# Patient Record
Sex: MALE | Race: OTHER | HISPANIC OR LATINO | Employment: FULL TIME | URBAN - METROPOLITAN AREA
[De-identification: names, ages, dates, MRNs, and addresses within clinical notes are randomized per-mention and may not be internally consistent; named-entity substitution may affect disease eponyms.]

---

## 2017-06-15 ENCOUNTER — HOSPITAL ENCOUNTER (EMERGENCY)
Facility: HOSPITAL | Age: 22
Discharge: HOME | End: 2017-06-15
Attending: EMERGENCY MEDICINE | Admitting: EMERGENCY MEDICINE
Payer: COMMERCIAL

## 2017-06-15 VITALS
DIASTOLIC BLOOD PRESSURE: 61 MMHG | OXYGEN SATURATION: 100 % | RESPIRATION RATE: 16 BRPM | HEART RATE: 81 BPM | WEIGHT: 132.28 LBS | SYSTOLIC BLOOD PRESSURE: 120 MMHG | TEMPERATURE: 97.6 F

## 2017-06-15 DIAGNOSIS — F11.10 HEROIN ABUSE (HCC): Primary | ICD-10-CM

## 2017-06-15 LAB
AMPHETAMINES SERPL QL SCN: NEGATIVE
BARBITURATES UR QL: NEGATIVE
BENZODIAZ UR QL: POSITIVE
COCAINE UR QL: POSITIVE
ETHANOL EXG-MCNC: 0 MG/DL
METHADONE UR QL: NEGATIVE
OPIATES UR QL SCN: POSITIVE
PCP UR QL: NEGATIVE
THC UR QL: NEGATIVE

## 2017-06-15 PROCEDURE — 82075 ASSAY OF BREATH ETHANOL: CPT | Performed by: EMERGENCY MEDICINE

## 2017-06-15 PROCEDURE — 80307 DRUG TEST PRSMV CHEM ANLYZR: CPT | Performed by: EMERGENCY MEDICINE

## 2017-06-15 PROCEDURE — 99284 EMERGENCY DEPT VISIT MOD MDM: CPT

## 2017-06-15 RX ORDER — DICYCLOMINE HCL 20 MG
20 TABLET ORAL ONCE
Status: COMPLETED | OUTPATIENT
Start: 2017-06-15 | End: 2017-06-15

## 2017-06-15 RX ORDER — ONDANSETRON 4 MG/1
4 TABLET, ORALLY DISINTEGRATING ORAL ONCE
Status: COMPLETED | OUTPATIENT
Start: 2017-06-15 | End: 2017-06-15

## 2017-06-15 RX ADMIN — DICYCLOMINE HYDROCHLORIDE 20 MG: 20 TABLET ORAL at 11:06

## 2017-06-15 RX ADMIN — ONDANSETRON 4 MG: 4 TABLET, ORALLY DISINTEGRATING ORAL at 11:06

## 2018-05-03 ENCOUNTER — APPOINTMENT (EMERGENCY)
Dept: RADIOLOGY | Facility: HOSPITAL | Age: 23
End: 2018-05-03
Payer: COMMERCIAL

## 2018-05-03 ENCOUNTER — HOSPITAL ENCOUNTER (EMERGENCY)
Facility: HOSPITAL | Age: 23
Discharge: HOME | End: 2018-05-03
Attending: EMERGENCY MEDICINE
Payer: COMMERCIAL

## 2018-05-03 VITALS
OXYGEN SATURATION: 98 % | HEART RATE: 91 BPM | DIASTOLIC BLOOD PRESSURE: 79 MMHG | TEMPERATURE: 99.2 F | WEIGHT: 150 LBS | SYSTOLIC BLOOD PRESSURE: 133 MMHG | RESPIRATION RATE: 16 BRPM

## 2018-05-03 DIAGNOSIS — M25.531 RIGHT WRIST PAIN: Primary | ICD-10-CM

## 2018-05-03 PROCEDURE — 99283 EMERGENCY DEPT VISIT LOW MDM: CPT

## 2018-05-03 PROCEDURE — 73110 X-RAY EXAM OF WRIST: CPT

## 2018-05-03 RX ORDER — NAPROXEN 500 MG/1
500 TABLET ORAL 2 TIMES DAILY WITH MEALS
Qty: 30 TABLET | Refills: 0 | Status: SHIPPED | OUTPATIENT
Start: 2018-05-03 | End: 2022-04-23 | Stop reason: ALTCHOICE

## 2018-05-03 NOTE — DISCHARGE INSTRUCTIONS
Wrist Injury   WHAT YOU NEED TO KNOW:   A wrist injury happens when the tissues of your wrist joint are damaged  Your wrist joint is made up of tendons, ligaments, nerves, and bones  Two common types of injuries that can happen to your wrist are sprains and strains  A sprain can happen when the ligaments are stretched or torn  Ligaments are bands of elastic tissue that connect and hold the bones together  A strain happens when a tendon or muscle is overused, stretched, or torn  Tendons attach your hand and arm muscles to the bones of the wrist    DISCHARGE INSTRUCTIONS:   Medicines:   · NSAIDs:  These medicines decrease swelling, pain, and fever  NSAIDs are available without a doctor's order  Ask which medicine is right for you  Ask how much to take and when to take it  Take as directed  NSAIDs can cause stomach bleeding and kidney problems if not taken correctly  · Pain medicine: You may be given a prescription medicine to decrease pain  Do not wait until the pain is severe before you take this medicine  · Take your medicine as directed  Contact your healthcare provider if you think your medicine is not helping or if you have side effects  Tell him of her if you are allergic to any medicine  Keep a list of the medicines, vitamins, and herbs you take  Include the amounts, and when and why you take them  Bring the list or the pill bottles to follow-up visits  Carry your medicine list with you in case of an emergency  Follow up with your healthcare provider as directed:  Write down your questions so you remember to ask them during your visits  Manage your symptoms:   · Wrist supports:  A cast or splint may be put on your fingers, hand, and wrist to support your wrist and prevent further damage  Wear these as directed  Ask for instructions on how to bathe while you are wearing a splint or case  · Rest:  You may need to rest your wrist for at least 48 hours and avoid activities that cause pain   Ask what activities you should avoid and for how long  · Ice:  Ice helps decrease swelling and pain  Ice may also help prevent tissue damage  Use an ice pack or put crushed ice in a plastic bag  Cover it with a towel and place it on your injured wrist for 15 to 20 minutes every hour as directed  · Compression:  Your healthcare provider may suggest you wrap your wrist with an elastic bandage  This will help decrease swelling, support your wrist, and help it heal  Wear your wrist wrap as directed  Ask for instructions on how to wrap your wrist     · Elevation:  When you sit or lie down, keep your wrist at or above the level of your heart  This may help decrease pain and swelling  Physical therapy:  Your healthcare provider may recommend that you go to physical therapy  A physical therapist shows you how to do exercises that can help to strengthen your wrist and improve its range of movement  These exercises may also help decrease your pain  Prevent another wrist injury:   · Do strengthening exercises: Your healthcare provider or physical therapist may suggest that you do exercises to strengthen your hand and arm muscles  Ask when you may return to your regular physical activities or sports  If you start to exercise too soon it may cause you to injure your wrist again  · Protect your wrists:  Wrist guard splints or protective tape can help to support your wrist during exercise and sports  These devices may also keep your wrist from bending too far back  Ask for more information about the type of wrist support that you should use  Contact your healthcare provider if:   · You have a fever  · The bruising, swelling, or pain in your wrist gets worse  · You have questions or concerns about your condition or care  Return to the emergency department if:   · The skin on or near your wrist or hand feels cold, or it turns blue or white      · The skin on or near your wrist or hand is very tight, raised, and swollen  · You have new trouble moving and using your hands, fingers, or wrist     · Your wrist, hands, or fingers become swollen, red, numb, or they tingle  · Your wrist has any open wounds, including from surgery, that are red, swollen, warm, or have pus coming from them  © 2017 [employer] [facility] Information is for End User's use only and may not be sold, redistributed or otherwise used for commercial purposes  All illustrations and images included in CareNotes® are the copyrighted property of [employer]  or Markie Kirkland  The above information is an  only  It is not intended as medical advice for individual conditions or treatments  Talk to your doctor, nurse or pharmacist before following any medical regimen to see if it is safe and effective for you  DISCHARGE INSTRUCTIONS:    FOLLOW UP WITH YOUR PRIMARY CARE PROVIDER OR THE [address] [place] [address]  MAKE AN APPOINTMENT TO BE SEEN  TAKE NAPROXEN AS PRESCRIBED FOR MILD PAIN AND INFLAMMATION  IF RASH, SHORTNESS OF BREATH OR TROUBLE SWALLOWING, STOP TAKING THE MEDICATION AND BE SEEN  FOLLOW UP WITH THE RECOMMENDED ORTHOPEDIC SPECIALIST  MAKE AN APPOINTMENT TO BE SEEN  REST, ICE AND ELEVATE THE AREA  WEAR THE SPLINT UNTIL SEEN BY THE RECOMMENDED ORTHOPEDIC SPECIALIST  DO NOT GET THE SPLINT WET  DO NOT TAKE THE SPLINT OFF  IF SYMPTOMS WORSEN OR NEW SYMPTOMS ARISE, RETURN TO THE ER TO BE SEEN

## 2018-05-03 NOTE — ED PROVIDER NOTES
History  Chief Complaint   Patient presents with    Wrist Pain     patient reports he injured his R wrist about four weeks ago  now complains of pain       23y  o male with PMH of anxiety and depression presents to the ER for right wrist pain for 4 weeks  Patient states he leaned too far back onto his hand and since then has been having this pain  He denies taking any medication for pain  He describes his pain as a "nerve pinching"  He denies radiation of pain  He rates his pain 4/10 and states it comes and goes  He denies fever, chills, chest pain, dyspnea, N/V/D, abdominal pain, weakness or paresthesias  History provided by:  Patient   used: No        None       Past Medical History:   Diagnosis Date    Anxiety     Depression        History reviewed  No pertinent surgical history  History reviewed  No pertinent family history  I have reviewed and agree with the history as documented  Social History   Substance Use Topics    Smoking status: Current Every Day Smoker     Packs/day: 1 00    Smokeless tobacco: Not on file    Alcohol use No        Review of Systems   Constitutional: Negative for chills and fever  Eyes: Negative for redness  Respiratory: Negative for shortness of breath  Cardiovascular: Negative for chest pain  Gastrointestinal: Negative for abdominal pain, diarrhea, nausea and vomiting  Musculoskeletal: Negative for neck stiffness  Skin: Negative for rash  Allergic/Immunologic: Negative for food allergies  Neurological: Negative for weakness and numbness         Physical Exam  ED Triage Vitals   Temperature Pulse Respirations Blood Pressure SpO2   05/03/18 1813 05/03/18 1811 05/03/18 1811 05/03/18 1811 05/03/18 1811   99 2 °F (37 3 °C) 91 16 133/79 98 %      Temp Source Heart Rate Source Patient Position - Orthostatic VS BP Location FiO2 (%)   05/03/18 1813 05/03/18 1811 05/03/18 1811 -- --   Temporal Monitor Sitting        Pain Score 05/03/18 1811       No Pain           Orthostatic Vital Signs  Vitals:    05/03/18 1811   BP: 133/79   Pulse: 91   Patient Position - Orthostatic VS: Sitting       Physical Exam   Constitutional:  Non-toxic appearance  No distress  HENT:   Head: Normocephalic and atraumatic  Right Ear: Tympanic membrane, external ear and ear canal normal  No drainage, swelling or tenderness  No foreign bodies  Tympanic membrane is not erythematous  No hemotympanum  Left Ear: Tympanic membrane, external ear and ear canal normal  No drainage, swelling or tenderness  No foreign bodies  Tympanic membrane is not erythematous  No hemotympanum  Nose: Nose normal    Mouth/Throat: Uvula is midline, oropharynx is clear and moist and mucous membranes are normal  No uvula swelling  No posterior oropharyngeal edema, posterior oropharyngeal erythema or tonsillar abscesses  No tonsillar exudate  Neck: Normal range of motion and phonation normal  Neck supple  No tracheal deviation present  Cardiovascular: Normal rate, regular rhythm, S1 normal, S2 normal and normal heart sounds  Exam reveals no gallop and no friction rub  No murmur heard  Pulmonary/Chest: Effort normal and breath sounds normal  No respiratory distress  He has no decreased breath sounds  He has no wheezes  He has no rhonchi  He has no rales  He exhibits no tenderness  Musculoskeletal:        Right wrist: He exhibits tenderness and bony tenderness  He exhibits normal range of motion, no swelling, no effusion, no crepitus, no deformity and no laceration  Right forearm: Normal         Right hand: He exhibits tenderness and bony tenderness  He exhibits normal range of motion, normal capillary refill, no deformity, no laceration and no swelling  Normal sensation noted  Normal strength noted  Hands:  Neurological: He is alert  GCS eye subscore is 4  GCS verbal subscore is 5  GCS motor subscore is 6  Skin: Skin is warm and dry  No rash noted  Psychiatric: He has a normal mood and affect  Nursing note and vitals reviewed  ED Medications  Medications - No data to display    Diagnostic Studies  Results Reviewed     None                 XR wrist 3+ views RIGHT   ED Interpretation by Fracisco Blanco PA-C (05/03 1932)   No acute fracture seen by me at this time  Widening of joint space between scaphoid and trapezium  Final Result by Rodney Marrufo MD (05/03 1952)      No acute osseous abnormality  Workstation performed: [id number]                    Procedures  Orthopedic Injury  Date/Time: 5/3/2018 7:35 PM  Performed by: Aisha Bruno by: Steffany Mcnamara   Consent: Verbal consent obtained    Consent given by: patient  Patient understanding: patient states understanding of the procedure being performed  Imaging studies: imaging studies available  Patient identity confirmed: arm band  Injury location: wrist  Location details: right wrist  Injury type: soft tissue  Pre-procedure neurovascular assessment: neurovascularly intact  Pre-procedure distal perfusion: normal  Pre-procedure neurological function: normal  Pre-procedure range of motion: normal    Anesthesia:  Local anesthesia used: no  General Anesthesia used: noSkeletal traction used: no  Immobilization: splint  Splint type: volar short arm  Supplies used: cotton padding,  elastic bandage and Ortho-Glass  Post-procedure neurovascular assessment: post-procedure neurovascularly intact  Post-procedure distal perfusion: normal  Post-procedure neurological function: normal  Post-procedure range of motion: normal  Patient tolerance: Patient tolerated the procedure well with no immediate complications             Phone Contacts  ED Phone Contact    ED Course                               MDM  Number of Diagnoses or Management Options  Right wrist pain: new and requires workup  Diagnosis management comments: DDX consists of but not limited to: fracture, contusion, dislocation, sprain    Will xray the wrist to r/o fracture  Widening between scaphoid and trapezium  Will place in volar splint  At discharge, I instructed the patient to:  -follow up with pcp  -take Naproxen as prescribed for pain and inflammation  -follow up with the recommended orthopedic specialist  -rest, ice and elevate the hand  -wear the splint until seen by orthopedics  -return to the ER if symptoms worsened or new symptoms arose  Patient agreed to this plan and was stable at time of discharge  Amount and/or Complexity of Data Reviewed  Tests in the radiology section of CPT®: ordered and reviewed  Independent visualization of images, tracings, or specimens: yes    Patient Progress  Patient progress: stable    CritCare Time    Disposition  Final diagnoses:   Right wrist pain     Time reflects when diagnosis was documented in both MDM as applicable and the Disposition within this note     Time User Action Codes Description Comment    5/3/2018  7:33 PM Mai Christie Add [M25 [id number]] Right wrist pain       ED Disposition     ED Disposition Condition Comment    Discharge  Kenya Almeida discharge to home/self care  Condition at discharge: Stable        Follow-up Information     Follow up With Specialties Details Why MD [facility] Schedule an appointment as soon as possible for a visit in 1 day  [address]      [address] Orthopedic Surgery Schedule an appointment as soon as possible for a visit in 1 day  [facility] [id number] [phone]        Discharge Medication List as of 5/3/2018  7:34 PM      START taking these medications    Details   naproxen (NAPROSYN) 500 mg tablet Take 1 tablet (500 mg total) by mouth 2 (two) times a day with meals, Starting Thu 5/3/2018, Print           No discharge procedures on file      ED Provider  Electronically Signed by Cl Willis PA-C  05/03/18 6752

## 2020-12-25 ENCOUNTER — HOSPITAL ENCOUNTER (EMERGENCY)
Facility: HOSPITAL | Age: 25
Discharge: HOME | End: 2020-12-25
Attending: EMERGENCY MEDICINE
Payer: COMMERCIAL

## 2020-12-25 VITALS
SYSTOLIC BLOOD PRESSURE: 141 MMHG | OXYGEN SATURATION: 100 % | RESPIRATION RATE: 20 BRPM | DIASTOLIC BLOOD PRESSURE: 68 MMHG | HEART RATE: 94 BPM | TEMPERATURE: 98.4 F

## 2020-12-25 DIAGNOSIS — T78.40XA ACUTE ALLERGIC REACTION: Primary | ICD-10-CM

## 2020-12-25 PROCEDURE — 96375 TX/PRO/DX INJ NEW DRUG ADDON: CPT

## 2020-12-25 PROCEDURE — 99282 EMERGENCY DEPT VISIT SF MDM: CPT | Performed by: EMERGENCY MEDICINE

## 2020-12-25 PROCEDURE — 99283 EMERGENCY DEPT VISIT LOW MDM: CPT

## 2020-12-25 PROCEDURE — 96374 THER/PROPH/DIAG INJ IV PUSH: CPT

## 2020-12-25 RX ORDER — DIPHENHYDRAMINE HYDROCHLORIDE 50 MG/ML
25 INJECTION INTRAMUSCULAR; INTRAVENOUS ONCE
Status: COMPLETED | OUTPATIENT
Start: 2020-12-25 | End: 2020-12-25

## 2020-12-25 RX ADMIN — FAMOTIDINE 20 MG: 10 INJECTION INTRAVENOUS at 11:31

## 2020-12-25 RX ADMIN — DIPHENHYDRAMINE HYDROCHLORIDE 25 MG: 50 INJECTION, SOLUTION INTRAMUSCULAR; INTRAVENOUS at 11:30

## 2020-12-29 ENCOUNTER — OFFICE VISIT (OUTPATIENT)
Dept: URGENT CARE | Age: 25
End: 2020-12-29
Payer: COMMERCIAL

## 2020-12-29 VITALS
WEIGHT: 130 LBS | BODY MASS INDEX: 18.61 KG/M2 | RESPIRATION RATE: 18 BRPM | TEMPERATURE: 96.8 F | OXYGEN SATURATION: 97 % | HEART RATE: 96 BPM | HEIGHT: 70 IN

## 2020-12-29 DIAGNOSIS — R52 BODY ACHES: Primary | ICD-10-CM

## 2020-12-29 DIAGNOSIS — Z11.59 SCREENING FOR VIRAL DISEASE: ICD-10-CM

## 2020-12-29 PROCEDURE — U0003 INFECTIOUS AGENT DETECTION BY NUCLEIC ACID (DNA OR RNA); SEVERE ACUTE RESPIRATORY SYNDROME CORONAVIRUS 2 (SARS-COV-2) (CORONAVIRUS DISEASE [COVID-19]), AMPLIFIED PROBE TECHNIQUE, MAKING USE OF HIGH THROUGHPUT TECHNOLOGIES AS DESCRIBED BY CMS-2020-01-R: HCPCS | Performed by: NURSE PRACTITIONER

## 2020-12-29 PROCEDURE — G0382 LEV 3 HOSP TYPE B ED VISIT: HCPCS | Performed by: NURSE PRACTITIONER

## 2020-12-31 ENCOUNTER — TELEPHONE (OUTPATIENT)
Dept: URGENT CARE | Age: 25
End: 2020-12-31

## 2020-12-31 LAB — SARS-COV-2 RNA SPEC QL NAA+PROBE: NOT DETECTED

## 2022-04-23 ENCOUNTER — HOSPITAL ENCOUNTER (EMERGENCY)
Facility: HOSPITAL | Age: 27
Discharge: HOME | End: 2022-04-23
Attending: EMERGENCY MEDICINE
Payer: COMMERCIAL

## 2022-04-23 ENCOUNTER — APPOINTMENT (EMERGENCY)
Dept: ULTRASOUND IMAGING | Facility: HOSPITAL | Age: 27
End: 2022-04-23
Payer: COMMERCIAL

## 2022-04-23 VITALS
TEMPERATURE: 98.3 F | SYSTOLIC BLOOD PRESSURE: 140 MMHG | RESPIRATION RATE: 20 BRPM | BODY MASS INDEX: 18.69 KG/M2 | OXYGEN SATURATION: 97 % | HEART RATE: 78 BPM | WEIGHT: 130.29 LBS | DIASTOLIC BLOOD PRESSURE: 73 MMHG

## 2022-04-23 DIAGNOSIS — N50.811 PAIN IN RIGHT TESTICLE: Primary | ICD-10-CM

## 2022-04-23 LAB
BACTERIA UR QL AUTO: ABNORMAL /HPF
BILIRUB UR QL STRIP: NEGATIVE
CLARITY UR: CLEAR
COLOR UR: YELLOW
GLUCOSE UR STRIP-MCNC: NEGATIVE MG/DL
HGB UR QL STRIP.AUTO: ABNORMAL
KETONES UR STRIP-MCNC: NEGATIVE MG/DL
LEUKOCYTE ESTERASE UR QL STRIP: NEGATIVE
NITRITE UR QL STRIP: NEGATIVE
NON-SQ EPI CELLS URNS QL MICRO: ABNORMAL /HPF
OTHER STN SPEC: ABNORMAL
PH UR STRIP.AUTO: 7 [PH] (ref 4.5–8)
PROT UR STRIP-MCNC: NEGATIVE MG/DL
RBC #/AREA URNS AUTO: ABNORMAL /HPF
SP GR UR STRIP.AUTO: 1.02 (ref 1–1.03)
UROBILINOGEN UR QL STRIP.AUTO: 0.2 E.U./DL
WBC #/AREA URNS AUTO: ABNORMAL /HPF

## 2022-04-23 PROCEDURE — 99282 EMERGENCY DEPT VISIT SF MDM: CPT | Performed by: EMERGENCY MEDICINE

## 2022-04-23 PROCEDURE — 87591 N.GONORRHOEAE DNA AMP PROB: CPT | Performed by: EMERGENCY MEDICINE

## 2022-04-23 PROCEDURE — 81001 URINALYSIS AUTO W/SCOPE: CPT

## 2022-04-23 PROCEDURE — 99284 EMERGENCY DEPT VISIT MOD MDM: CPT

## 2022-04-23 PROCEDURE — 87491 CHLMYD TRACH DNA AMP PROBE: CPT | Performed by: EMERGENCY MEDICINE

## 2022-04-23 PROCEDURE — 76870 US EXAM SCROTUM: CPT

## 2022-04-23 NOTE — ED PROVIDER NOTES
History  Chief Complaint   Patient presents with    Testicle Injury     pt reports right testicle swelling after getting hit at work about 1 month ago       History provided by:  Patient   used: No    Testicle Pain  Quality:  Right testicular pain  Severity:  Moderate  Duration:  3 months  Timing:  Intermittent  Progression:  Waxing and waning  Chronicity:  Recurrent  Context:  Right testicular pain, mild swelling, also got hit by a solid thick/heavy wire in the right testicle 3 months back (works as )  Relieved by:  Nothing  Worsened by:  Nothing  Ineffective treatments:  None  Associated symptoms: no abdominal pain, no chest pain, no cough, no diarrhea, no fever, no headaches, no loss of consciousness, no nausea, no rash, no shortness of breath, no sore throat and no vomiting    Risk factors:  Hep C, Anxiety, Depression      None       Past Medical History:   Diagnosis Date    Anxiety     Depression     Hepatitis C        History reviewed  No pertinent surgical history  History reviewed  No pertinent family history  I have reviewed and agree with the history as documented  E-Cigarette/Vaping     E-Cigarette/Vaping Substances     Social History     Tobacco Use    Smoking status: Current Every Day Smoker     Packs/day: 1 00    Smokeless tobacco: Never Used   Substance Use Topics    Alcohol use: No     Alcohol/week: 1 0 standard drink     Types: 1 Cans of beer per week    Drug use: No     Comment: former heroin user, Aristides        Review of Systems   Constitutional: Negative for chills and fever  HENT: Negative for facial swelling, sore throat and trouble swallowing  Eyes: Negative for pain and visual disturbance  Respiratory: Negative for cough and shortness of breath  Cardiovascular: Negative for chest pain and leg swelling  Gastrointestinal: Negative for abdominal pain, diarrhea, nausea and vomiting  Genitourinary: Positive for testicular pain   Negative for dysuria and flank pain  Musculoskeletal: Negative for back pain, neck pain and neck stiffness  Skin: Negative for pallor and rash  Allergic/Immunologic: Negative for environmental allergies and immunocompromised state  Neurological: Negative for dizziness, loss of consciousness and headaches  Hematological: Negative for adenopathy  Does not bruise/bleed easily  Psychiatric/Behavioral: Negative for agitation and behavioral problems  All other systems reviewed and are negative  Physical Exam  Physical Exam  Vitals and nursing note reviewed  Constitutional:       General: He is not in acute distress  Appearance: He is well-developed  HENT:      Head: Normocephalic and atraumatic  Eyes:      Extraocular Movements: Extraocular movements intact  Cardiovascular:      Rate and Rhythm: Normal rate and regular rhythm  Pulmonary:      Effort: Pulmonary effort is normal  No respiratory distress  Abdominal:      Palpations: Abdomen is soft  Tenderness: There is no abdominal tenderness  There is no guarding or rebound  Genitourinary:     Penis: Normal and circumcised  Testes:         Right: Tenderness (mild right side) present  Musculoskeletal:         General: Normal range of motion  Cervical back: Normal range of motion and neck supple  Skin:     General: Skin is warm and dry  Neurological:      General: No focal deficit present  Mental Status: He is alert and oriented to person, place, and time     Psychiatric:         Mood and Affect: Mood normal          Behavior: Behavior normal          Vital Signs  ED Triage Vitals [04/23/22 1450]   Temperature Pulse Respirations Blood Pressure SpO2   98 3 °F (36 8 °C) 78 20 140/73 97 %      Temp Source Heart Rate Source Patient Position - Orthostatic VS BP Location FiO2 (%)   Oral -- -- -- --      Pain Score       --           Vitals:    04/23/22 1450   BP: 140/73   Pulse: 78         Visual Acuity      ED Medications  Medications - No data to display    Diagnostic Studies  Results Reviewed     Procedure Component Value Units Date/Time    Urine Microscopic [[id number]]  (Abnormal) Collected: 04/23/22 1536    Lab Status: Final result Specimen: Urine, Clean Catch Updated: 04/23/22 1606     RBC, UA None Seen /hpf      WBC, UA 0-1 /hpf      Epithelial Cells None Seen /hpf      Bacteria, UA None Seen /hpf      OTHER OBSERVATIONS Sperm Present    Chlamydia/GC amplified DNA by PCR [[id number]] Collected: 04/23/22 1546    Lab Status: In process Specimen: Urine, Other Updated: 04/23/22 1548    Urine Macroscopic, POC [[id number]]  (Abnormal) Collected: 04/23/22 1536    Lab Status: Final result Specimen: Urine Updated: 04/23/22 1537     Color, UA Yellow     Clarity, UA Clear     pH, UA 7 0     Leukocytes, UA Negative     Nitrite, UA Negative     Protein, UA Negative mg/dl      Glucose, UA Negative mg/dl      Ketones, UA Negative mg/dl      Urobilinogen, UA 0 2 E U /dl      Bilirubin, UA Negative     Blood, UA Trace     Specific [place], UA 1 020    Narrative:      CLINITEK RESULT                 US scrotum and testicles   Final Result by Avery Yanes MD (04/23 1614)       Normal        Workstation performed: [id number]                    Procedures  Procedures         ED Course  ED Course as of 04/23/22 1640   Sat Apr 23, 2022   1621 US results reviewed:    IMPRESSION:     Normal                                 SBIRT 20yo+      Most Recent Value   SBIRT (25 yo +)    In order to provide better care to our patients, we are screening all of our patients for alcohol and drug use  Would it be okay to ask you these screening questions?  No Filed at: 04/23/2022 1459                    [facility]  Number of Diagnoses or Management Options  Pain in right testicle: new and requires workup  Diagnosis management comments: Patient is a 66-year-old male, history of depression, anxiety, hepatitis-C, previous IV drug use, complains of right testicular pain, patient states that he has been having off and on pain since about 3 months, when he got hit by a solid wire while working (works as an ), feels mild swelling above the right testicle as well, often known urinary frequency, however was tested recently for STDs and came back negative, no hematuria, no abdominal flank pain, no penile discharge  On exam, patient is conscious, alert, vital signs stable, no acute distress, abdomen soft nontender, no CVA tenderness,  exam shows bilaterally descended testicles, normal lie, mild swelling above right testicle, possibly varicocele  Impression:  Nonspecific right testicular pain, possibly varicocele, rule out torsion, will get ultrasound, check urine, send gonorrhea/chlamydia however patient has no current STD symptoms  Amount and/or Complexity of Data Reviewed  Clinical lab tests: ordered  Tests in the radiology section of CPT®: ordered and reviewed  Independent visualization of images, tracings, or specimens: yes        Disposition  Final diagnoses:   Pain in right testicle     Time reflects when diagnosis was documented in both MDM as applicable and the Disposition within this note     Time User Action Codes Description Comment    4/23/2022  3:30 PM Rajeev [address] Pain in right testicle       ED Disposition     ED Disposition Condition Date/Time Comment    Discharge Stable Sat Apr 23, 2022  4:22 PM Selwyn Mena discharge to home/self care  Follow-up Information     Follow up With Specialties Details Why Contact Info Additional [address] [facility] [address] [place] [facility]  As needed [address] [facility] [address]  [address]  [facility] [address] [facility], [place], [address] [phone]          There are no discharge medications for this patient  No discharge procedures on file      PDMP Review     None          ED Provider  Electronically Signed by           Norris Brand MD  04/23/22 1640

## 2022-04-26 LAB
C TRACH DNA SPEC QL NAA+PROBE: NEGATIVE
N GONORRHOEA DNA SPEC QL NAA+PROBE: NEGATIVE

## 2022-07-30 ENCOUNTER — APPOINTMENT (EMERGENCY)
Dept: RADIOLOGY | Facility: HOSPITAL | Age: 27
End: 2022-07-30
Payer: COMMERCIAL

## 2022-07-30 ENCOUNTER — HOSPITAL ENCOUNTER (EMERGENCY)
Facility: HOSPITAL | Age: 27
Discharge: HOME | End: 2022-07-30
Attending: EMERGENCY MEDICINE
Payer: COMMERCIAL

## 2022-07-30 VITALS
WEIGHT: 127.87 LBS | TEMPERATURE: 98.7 F | OXYGEN SATURATION: 98 % | SYSTOLIC BLOOD PRESSURE: 106 MMHG | BODY MASS INDEX: 18.31 KG/M2 | RESPIRATION RATE: 18 BRPM | DIASTOLIC BLOOD PRESSURE: 69 MMHG | HEART RATE: 60 BPM | HEIGHT: 70 IN

## 2022-07-30 DIAGNOSIS — R07.9 CHEST PAIN: ICD-10-CM

## 2022-07-30 DIAGNOSIS — F41.9 ANXIETY: ICD-10-CM

## 2022-07-30 DIAGNOSIS — R42 LIGHTHEADEDNESS: Primary | ICD-10-CM

## 2022-07-30 LAB
ANION GAP SERPL CALCULATED.3IONS-SCNC: 8 MMOL/L (ref 4–13)
BASOPHILS # BLD AUTO: 0.06 THOUSANDS/ΜL (ref 0–0.1)
BASOPHILS NFR BLD AUTO: 1 % (ref 0–1)
BUN SERPL-MCNC: 10 MG/DL (ref 5–25)
CALCIUM SERPL-MCNC: 8.8 MG/DL (ref 8.3–10.1)
CHLORIDE SERPL-SCNC: 103 MMOL/L (ref 96–108)
CO2 SERPL-SCNC: 29 MMOL/L (ref 21–32)
CREAT SERPL-MCNC: 1.29 MG/DL (ref 0.6–1.3)
EOSINOPHIL # BLD AUTO: 0.24 THOUSAND/ΜL (ref 0–0.61)
EOSINOPHIL NFR BLD AUTO: 3 % (ref 0–6)
ERYTHROCYTE [DISTWIDTH] IN BLOOD BY AUTOMATED COUNT: 11.6 % (ref 11.6–15.1)
GFR SERPL CREATININE-BSD FRML MDRD: 74 ML/MIN/1.73SQ M
GLUCOSE SERPL-MCNC: 102 MG/DL (ref 65–140)
HCT VFR BLD AUTO: 44.5 % (ref 36.5–49.3)
HGB BLD-MCNC: 15.2 G/DL (ref 12–17)
IMM GRANULOCYTES # BLD AUTO: 0.01 THOUSAND/UL (ref 0–0.2)
IMM GRANULOCYTES NFR BLD AUTO: 0 % (ref 0–2)
LYMPHOCYTES # BLD AUTO: 3.51 THOUSANDS/ΜL (ref 0.6–4.47)
LYMPHOCYTES NFR BLD AUTO: 43 % (ref 14–44)
MCH RBC QN AUTO: 31.5 PG (ref 26.8–34.3)
MCHC RBC AUTO-ENTMCNC: 34.2 G/DL (ref 31.4–37.4)
MCV RBC AUTO: 92 FL (ref 82–98)
MONOCYTES # BLD AUTO: 0.59 THOUSAND/ΜL (ref 0.17–1.22)
MONOCYTES NFR BLD AUTO: 7 % (ref 4–12)
NEUTROPHILS # BLD AUTO: 3.84 THOUSANDS/ΜL (ref 1.85–7.62)
NEUTS SEG NFR BLD AUTO: 46 % (ref 43–75)
NRBC BLD AUTO-RTO: 0 /100 WBCS
PLATELET # BLD AUTO: 217 THOUSANDS/UL (ref 149–390)
PMV BLD AUTO: 10.5 FL (ref 8.9–12.7)
POTASSIUM SERPL-SCNC: 3.6 MMOL/L (ref 3.5–5.3)
RBC # BLD AUTO: 4.82 MILLION/UL (ref 3.88–5.62)
SODIUM SERPL-SCNC: 140 MMOL/L (ref 135–147)
TSH SERPL DL<=0.05 MIU/L-ACNC: 2.63 UIU/ML (ref 0.45–4.5)
WBC # BLD AUTO: 8.25 THOUSAND/UL (ref 4.31–10.16)

## 2022-07-30 PROCEDURE — 84443 ASSAY THYROID STIM HORMONE: CPT | Performed by: PHYSICIAN ASSISTANT

## 2022-07-30 PROCEDURE — 36415 COLL VENOUS BLD VENIPUNCTURE: CPT | Performed by: PHYSICIAN ASSISTANT

## 2022-07-30 PROCEDURE — 85025 COMPLETE CBC W/AUTO DIFF WBC: CPT | Performed by: PHYSICIAN ASSISTANT

## 2022-07-30 PROCEDURE — 71045 X-RAY EXAM CHEST 1 VIEW: CPT

## 2022-07-30 PROCEDURE — 96360 HYDRATION IV INFUSION INIT: CPT

## 2022-07-30 PROCEDURE — 99285 EMERGENCY DEPT VISIT HI MDM: CPT

## 2022-07-30 PROCEDURE — 99285 EMERGENCY DEPT VISIT HI MDM: CPT | Performed by: PHYSICIAN ASSISTANT

## 2022-07-30 PROCEDURE — 80048 BASIC METABOLIC PNL TOTAL CA: CPT | Performed by: PHYSICIAN ASSISTANT

## 2022-07-30 PROCEDURE — 93005 ELECTROCARDIOGRAM TRACING: CPT

## 2022-07-30 RX ORDER — HYDROXYZINE HYDROCHLORIDE 25 MG/1
25 TABLET, FILM COATED ORAL EVERY 6 HOURS PRN
Qty: 20 TABLET | Refills: 0 | Status: SHIPPED | OUTPATIENT
Start: 2022-07-30

## 2022-07-30 RX ADMIN — SODIUM CHLORIDE 1000 ML: 0.9 INJECTION, SOLUTION INTRAVENOUS at 22:50

## 2022-07-30 NOTE — Clinical Note
Maegan Huang was seen and treated in our emergency department on 7/30/2022  Diagnosis:     Don Radha    He may return on this date: 08/01/2022         If you have any questions or concerns, please don't hesitate to call        Farzana Talley PA-C    ______________________________           _______________          _______________  Hospital Representative                              Date                                Time

## 2022-07-31 LAB
ATRIAL RATE: 68 BPM
P AXIS: 81 DEGREES
PR INTERVAL: 156 MS
QRS AXIS: 88 DEGREES
QRSD INTERVAL: 80 MS
QT INTERVAL: 380 MS
QTC INTERVAL: 404 MS
T WAVE AXIS: 76 DEGREES
VENTRICULAR RATE: 68 BPM

## 2022-07-31 PROCEDURE — 93010 ELECTROCARDIOGRAM REPORT: CPT | Performed by: INTERNAL MEDICINE

## 2022-07-31 NOTE — ED PROVIDER NOTES
History  Chief Complaint   Patient presents with    Chest Pain     Pt reports CP x 1 hour +dizziness and lightheaded  Pt states he feels like it is triggering an anxiety attack      Gino Castillo is a 31 yo M presenting with lightheadedness, chest tightness, shortness of breath, and anxiety symptoms  He notes symptoms began initially with shortness of breath and anxiety after which he developed chest pain and began breathing faster/deeper and became lightheaded  He notes several previous episodes of panic attack although reports he has not experienced lightheadedness previously  Denies specific stressor/trigger prior to onset of symptoms  Denies alcohol or illicit substance use  Reports his shortness of breath has improved since onset, chest tightness and lightheadedness is ongoing  No medications prior to arrival for symptoms  History provided by:  Patient   used: No        None       Past Medical History:   Diagnosis Date    Anxiety     Depression     Hepatitis C        History reviewed  No pertinent surgical history  History reviewed  No pertinent family history  I have reviewed and agree with the history as documented  E-Cigarette/Vaping    E-Cigarette Use Current Every Day User      E-Cigarette/Vaping Substances     Social History     Tobacco Use    Smoking status: Current Some Day Smoker     Packs/day: 1 00     Types: Cigarettes    Smokeless tobacco: Never Used   Vaping Use    Vaping Use: Every day   Substance Use Topics    Alcohol use: No     Alcohol/week: 1 0 standard drink     Types: 1 Cans of beer per week    Drug use: Not Currently     Comment: former heroin user, Aristides        Review of Systems   Constitutional: Negative for chills and fever  HENT: Negative for congestion, rhinorrhea and sore throat  Eyes: Negative for pain and visual disturbance  Respiratory: Positive for chest tightness and shortness of breath  Negative for cough and wheezing      Cardiovascular: Negative for palpitations and leg swelling  Gastrointestinal: Negative for abdominal pain, nausea and vomiting  Genitourinary: Negative for dysuria, frequency and urgency  Musculoskeletal: Negative for back pain, neck pain and neck stiffness  Skin: Negative for rash and wound  Neurological: Positive for light-headedness  Negative for dizziness, weakness and numbness  Psychiatric/Behavioral: The patient is nervous/anxious  Physical Exam  Physical Exam  Constitutional:       General: He is not in acute distress  Appearance: He is well-developed  He is not diaphoretic  HENT:      Head: Normocephalic and atraumatic  Right Ear: External ear normal       Left Ear: External ear normal    Eyes:      Conjunctiva/sclera: Conjunctivae normal       Pupils: Pupils are equal, round, and reactive to light  Cardiovascular:      Rate and Rhythm: Normal rate and regular rhythm  Heart sounds: Normal heart sounds  No murmur heard  No friction rub  No gallop  Comments: No lower extremity edema or calf TTP  Pulmonary:      Effort: Pulmonary effort is normal  No respiratory distress  Breath sounds: Normal breath sounds  No wheezing, rhonchi or rales  Abdominal:      General: There is no distension  Palpations: Abdomen is soft  Tenderness: There is no abdominal tenderness  Musculoskeletal:      Cervical back: Normal range of motion and neck supple  Lymphadenopathy:      Cervical: No cervical adenopathy  Skin:     General: Skin is warm and dry  Capillary Refill: Capillary refill takes less than 2 seconds  Findings: No erythema or rash  Neurological:      Mental Status: He is alert and oriented to person, place, and time  Motor: No abnormal muscle tone  Coordination: Coordination normal    Psychiatric:         Mood and Affect: Mood is anxious  Behavior: Behavior normal          Thought Content:  Thought content normal          Judgment: Judgment normal          Vital Signs  ED Triage Vitals [07/30/22 2148]   Temperature Pulse Respirations Blood Pressure SpO2   98 7 °F (37 1 °C) 77 16 131/86 99 %      Temp Source Heart Rate Source Patient Position - Orthostatic VS BP Location FiO2 (%)   Oral Monitor Sitting Left arm --      Pain Score       2           Vitals:    07/30/22 2148 07/30/22 2300   BP: 131/86 106/69   Pulse: 77 60   Patient Position - Orthostatic VS: Sitting          Visual Acuity      ED Medications  Medications   sodium chloride 0 9 % bolus 1,000 mL (0 mL Intravenous Stopped 7/30/22 7843)       Diagnostic Studies  Results Reviewed     Procedure Component Value Units Date/Time    Basic metabolic panel [[id number]] Collected: 07/30/22 2251    Lab Status: Final result Specimen: Blood from Arm, Right Updated: 07/30/22 2319     Sodium 140 mmol/L      Potassium 3 6 mmol/L      Chloride 103 mmol/L      CO2 29 mmol/L      ANION GAP 8 mmol/L      BUN 10 mg/dL      Creatinine 1 29 mg/dL      Glucose 102 mg/dL      Calcium 8 8 mg/dL      eGFR 74 ml/min/1 73sq m     Narrative:      [place] guidelines for Chronic Kidney Disease (CKD):     Stage 1 with normal or high GFR (GFR > 90 mL/min/1 73 square meters)    Stage 2 Mild CKD (GFR = 60-89 mL/min/1 73 square meters)    Stage 3A Moderate CKD (GFR = 45-59 mL/min/1 73 square meters)    Stage 3B Moderate CKD (GFR = 30-44 mL/min/1 73 square meters)    Stage 4 Severe CKD (GFR = 15-29 mL/min/1 73 square meters)    Stage 5 End Stage CKD (GFR <15 mL/min/1 73 square meters)  Note: GFR calculation is accurate only with a steady state creatinine    TSH, 3rd generation with Free T4 reflex [[id number]]  (Normal) Collected: 07/30/22 2251    Lab Status: Final result Specimen: Blood from Arm, Right Updated: 07/30/22 2319     TSH 3RD GENERATON 2 629 uIU/mL     Narrative:      Patients undergoing fluorescein dye angiography may retain small amounts of fluorescein in the body for 48-72 hours post procedure  Samples containing fluorescein can produce falsely depressed TSH values  If the patient had this procedure,a specimen should be resubmitted post fluorescein clearance  CBC and differential [[id number]] Collected: 07/30/22 2251    Lab Status: Final result Specimen: Blood from Arm, Right Updated: 07/30/22 2256     WBC 8 25 Thousand/uL      RBC 4 82 Million/uL      Hemoglobin 15 2 g/dL      Hematocrit 44 5 %      MCV 92 fL      MCH 31 5 pg      MCHC 34 2 g/dL      RDW 11 6 %      MPV 10 5 fL      Platelets 637 Thousands/uL      nRBC 0 /100 WBCs      Neutrophils Relative 46 %      Immat GRANS % 0 %      Lymphocytes Relative 43 %      Monocytes Relative 7 %      Eosinophils Relative 3 %      Basophils Relative 1 %      Neutrophils Absolute 3 84 Thousands/µL      Immature Grans Absolute 0 01 Thousand/uL      Lymphocytes Absolute 3 51 Thousands/µL      Monocytes Absolute 0 59 Thousand/µL      Eosinophils Absolute 0 24 Thousand/µL      Basophils Absolute 0 06 Thousands/µL                  XR chest 1 view portable   Final Result by Arcadio Boast, MD (07/31 0940)      No acute cardiopulmonary disease                    Workstation performed: [id number]                    Procedures  ECG 12 Lead Documentation Only    Date/Time: 7/31/2022 10:05 PM  Performed by: Justin John PA-C  Authorized by: Justin John PA-C     Indications / Diagnosis:  Chest tightness  ECG reviewed by me, the ED Provider: yes    Patient location:  ED  Previous ECG:     Previous ECG:  Compared to current    Comparison ECG info:  Now sinus rhythm    Similarity:  Changes noted    Comparison to cardiac monitor: Yes    Interpretation:     Interpretation: normal    Rate:     ECG rate:  68    ECG rate assessment: normal    Rhythm:     Rhythm: sinus rhythm      Rhythm comment:  With sinus arrhythmia  Ectopy:     Ectopy: none    QRS:     QRS axis:  Normal    QRS intervals:  Normal  Conduction:     Conduction: normal    ST segments:     ST segments:  Normal  T waves:     T waves: normal               ED Course                                             MDM  Number of Diagnoses or Management Options  Anxiety  Chest pain  Lightheadedness  Diagnosis management comments: Episode of lightheadedness, chest tightness, dyspnea, anxiety with onset about 1 hour PTA  Symptoms largely improved although does note some ongoing chest discomfort and lightheadedness  Vitals WNL  Noted to be slightly anxious on exam, otherwise exam is benign  EKG shows NSR with acute ischemic changes/ectopy  XR chest clear on my initial read  Basic labs, TSH unrevealing  Pt declines anti-anxiety medications here  History c/w panic attack  Plan for discharge with atarax PRN  F/u with PCP recommended  Return to ED indications reviewed  Amount and/or Complexity of Data Reviewed  Clinical lab tests: ordered and reviewed  Tests in the radiology section of CPT®: reviewed and ordered    Patient Progress  Patient progress: improved      Disposition  Final diagnoses:   Lightheadedness   Chest pain   Anxiety     Time reflects when diagnosis was documented in both MDM as applicable and the Disposition within this note     Time User Action Codes Description Comment    7/30/2022 11:33 PM Ezra Co [R42] Lightheadedness     7/30/2022 11:33 PM [place] Co [R07 9] Chest pain     7/30/2022 11:33 PM Electa Search Add [F41 9] Anxiety       ED Disposition     ED Disposition   Discharge    Condition   Stable    Date/Time   Sat Jul 30, 2022 11:33 PM    Comment   Alma Delia Mercado discharge to home/self care                 Follow-up Information     Follow up With Specialties Details Why Contact Info Additional [address] [facility] Emergency Department Emergency Medicine  If symptoms worsen [place] [id number]  [address] [place] [address] Emergency Department, [address] , [place], [place], [address]          Discharge Medication List as of 7/30/2022 11:35 PM      START taking these medications    Details   hydrOXYzine HCL (ATARAX) 25 mg tablet Take 1 tablet (25 mg total) by mouth every 6 (six) hours as needed for anxiety, Starting Sat 7/30/2022, Normal             No discharge procedures on file      PDMP Review     None          ED Provider  Electronically Signed by           Nini Grewal PA-C  07/31/22 6765

## 2022-12-02 ENCOUNTER — HOSPITAL ENCOUNTER (EMERGENCY)
Facility: HOSPITAL | Age: 27
Discharge: HOME | End: 2022-12-02
Attending: EMERGENCY MEDICINE

## 2022-12-02 VITALS
BODY MASS INDEX: 18.18 KG/M2 | OXYGEN SATURATION: 98 % | SYSTOLIC BLOOD PRESSURE: 122 MMHG | DIASTOLIC BLOOD PRESSURE: 71 MMHG | HEIGHT: 70 IN | WEIGHT: 127 LBS | HEART RATE: 97 BPM | TEMPERATURE: 97.4 F | RESPIRATION RATE: 18 BRPM

## 2022-12-02 DIAGNOSIS — S81.019A KNEE LACERATION: Primary | ICD-10-CM

## 2022-12-02 RX ORDER — GINSENG 100 MG
1 CAPSULE ORAL 2 TIMES DAILY
Qty: 28 G | Refills: 0 | Status: SHIPPED | OUTPATIENT
Start: 2022-12-02

## 2022-12-02 RX ORDER — GINSENG 100 MG
1 CAPSULE ORAL ONCE
Status: COMPLETED | OUTPATIENT
Start: 2022-12-02 | End: 2022-12-02

## 2022-12-02 RX ORDER — LIDOCAINE HYDROCHLORIDE AND EPINEPHRINE 10; 10 MG/ML; UG/ML
20 INJECTION, SOLUTION INFILTRATION; PERINEURAL ONCE
Status: COMPLETED | OUTPATIENT
Start: 2022-12-02 | End: 2022-12-02

## 2022-12-02 RX ADMIN — BACITRACIN 1 SMALL APPLICATION: 500 OINTMENT TOPICAL at 19:54

## 2022-12-02 RX ADMIN — LIDOCAINE HYDROCHLORIDE,EPINEPHRINE BITARTRATE 20 ML: 10; .01 INJECTION, SOLUTION INFILTRATION; PERINEURAL at 19:54

## 2022-12-03 NOTE — DISCHARGE INSTRUCTIONS
Return to the ED in 1 week for suture removal   Apply bacitracin the antibiotic ointment twice a day on the wound  Keep it covered and clean if you notice any redness swelling pus please return to the ED immediately  Do not bend your knee at work

## 2022-12-03 NOTE — ED PROVIDER NOTES
History  Chief Complaint   Patient presents with   • Laceration     Right leg above the knee laceration from a razor blade     17-year-old male presents with a laceration above his right knee sustained with razor blade today  Tetanus is up-to-date  No other complaints  History provided by:  Patient   used: No        Prior to Admission Medications   Prescriptions Last Dose Informant Patient Reported? Taking?   hydrOXYzine HCL (ATARAX) 25 mg tablet   No No   Sig: Take 1 tablet (25 mg total) by mouth every 6 (six) hours as needed for anxiety      Facility-Administered Medications: None       Past Medical History:   Diagnosis Date   • Anxiety    • Depression    • Hepatitis C        History reviewed  No pertinent surgical history  History reviewed  No pertinent family history  I have reviewed and agree with the history as documented  E-Cigarette/Vaping   • E-Cigarette Use Current Every Day User      E-Cigarette/Vaping Substances     Social History     Tobacco Use   • Smoking status: Some Days     Packs/day: 1 00     Types: Cigarettes   • Smokeless tobacco: Never   Vaping Use   • Vaping Use: Every day   Substance Use Topics   • Alcohol use: No     Alcohol/week: 1 0 standard drink     Types: 1 Cans of beer per week   • Drug use: Not Currently     Comment: former heroin user, Aristides        Review of Systems   Constitutional: Negative  HENT: Negative  Eyes: Negative  Respiratory: Negative  Cardiovascular: Negative  Gastrointestinal: Negative  Endocrine: Negative  Genitourinary: Negative  Musculoskeletal: Negative  Skin: Positive for wound  Allergic/Immunologic: Negative  Neurological: Negative  Hematological: Negative  Psychiatric/Behavioral: Negative  All other systems reviewed and are negative  Physical Exam  Physical Exam  Constitutional:       Appearance: Normal appearance  HENT:      Head: Normocephalic and atraumatic        Nose: Nose normal  Mouth/Throat:      Mouth: Mucous membranes are moist    Eyes:      Extraocular Movements: Extraocular movements intact  Pupils: Pupils are equal, round, and reactive to light  Cardiovascular:      Rate and Rhythm: Normal rate and regular rhythm  Pulmonary:      Effort: Pulmonary effort is normal       Breath sounds: Normal breath sounds  Abdominal:      General: Abdomen is flat  Bowel sounds are normal       Palpations: Abdomen is soft  Musculoskeletal:         General: Normal range of motion  Cervical back: Normal range of motion and neck supple  Comments: Right knee:  Above the knee joint and vertical 3 cm linear laceration superficial noted  Positive popliteal pulses no foreign bodies noted  Range of motion of the knee joint is intact  Skin:     General: Skin is warm  Capillary Refill: Capillary refill takes less than 2 seconds  Neurological:      General: No focal deficit present  Mental Status: He is alert and oriented to person, place, and time  Mental status is at baseline  Psychiatric:         Mood and Affect: Mood normal          Thought Content:  Thought content normal          Vital Signs  ED Triage Vitals   Temperature Pulse Respirations Blood Pressure SpO2   12/02/22 1823 12/02/22 1823 12/02/22 1823 12/02/22 1823 12/02/22 1823   (!) 97 4 °F (36 3 °C) 97 18 122/71 98 %      Temp src Heart Rate Source Patient Position - Orthostatic VS BP Location FiO2 (%)   -- -- -- -- --             Pain Score       12/02/22 1822       No Pain           Vitals:    12/02/22 1823   BP: 122/71   Pulse: 97         Visual Acuity      ED Medications  Medications   lidocaine-epinephrine (XYLOCAINE/EPINEPHRINE) 1 %-1:100,000 injection 20 mL (20 mL Infiltration Given 12/2/22 1954)   bacitracin topical ointment 1 small application (1 small application Topical Given 12/2/22 1954)       Diagnostic Studies  Results Reviewed     None                 No orders to display Procedures  Laceration repair    Date/Time: 12/2/2022 8:02 PM  Performed by: Gabe Hayes DO  Authorized by: Gabe Hayes DO   Consent: Verbal consent obtained  Consent given by: patient  Patient identity confirmed: verbally with patient  Body area: lower extremity  Location details: right knee  Anesthesia: local infiltration    Anesthesia:  Local Anesthetic: lidocaine 1% with epinephrine    Wound Dehiscence:  Superficial Wound Dehiscence: simple closure      Procedure Details:  Irrigation solution: saline  Skin closure: 4-0 Prolene  Number of sutures: 4  Technique: simple  Approximation: close  Approximation difficulty: simple  Dressing: 4x4 sterile gauze and antibiotic ointment  Patient tolerance: patient tolerated the procedure well with no immediate complications               ED Course                               SBIRT 20yo+    Flowsheet Row Most Recent Value   SBIRT (25 yo +)    In order to provide better care to our patients, we are screening all of our patients for alcohol and drug use  Would it be okay to ask you these screening questions? No Filed at: 12/02/2022 1839                    [facility]  Number of Diagnoses or Management Options  Patient Progress  Patient progress: stable      Disposition  Final diagnoses:   Knee laceration     Time reflects when diagnosis was documented in both MDM as applicable and the Disposition within this note     Time User Action Codes Description Comment    12/2/2022  8:03 PM Tilmon Knock Add [S81 019A] Knee laceration       ED Disposition     ED Disposition   Discharge    Condition   Stable    Date/Time   Fri Dec 2, 2022  8:03 PM    Comment   Kristine Abreu discharge to home/self care                 Follow-up Information     Follow up With Specialties Details Why Contact Info Additional Information    [address] [facility] Emergency Department Emergency Medicine In 1 week For suture removal [address] [place] [address]  [phone] [facility] Emergency Department, [place], [place], [address]          Patient's Medications   Discharge Prescriptions    BACITRACIN TOPICAL OINTMENT 500 UNITS/G TOPICAL OINTMENT    Apply 1 large application topically 2 (two) times a day       Start Date: 12/2/2022 End Date: --       Order Dose: 1 large application       Quantity: 28 g    Refills: 0       No discharge procedures on file      PDMP Review     None          ED Provider  Electronically Signed by           Ivette Dimas DO  12/02/22 2004

## 2023-12-03 ENCOUNTER — HOSPITAL ENCOUNTER (EMERGENCY)
Facility: HOSPITAL | Age: 28
End: 2023-12-04
Attending: INTERNAL MEDICINE | Admitting: EMERGENCY MEDICINE
Payer: COMMERCIAL

## 2023-12-03 DIAGNOSIS — R45.1 AGITATION: ICD-10-CM

## 2023-12-03 DIAGNOSIS — Z00.8 MEDICAL CLEARANCE FOR PSYCHIATRIC ADMISSION: ICD-10-CM

## 2023-12-03 DIAGNOSIS — Z00.8 ENCOUNTER FOR PSYCHOLOGICAL EVALUATION: Primary | ICD-10-CM

## 2023-12-03 DIAGNOSIS — R44.3 HALLUCINATION: ICD-10-CM

## 2023-12-03 LAB
AMPHETAMINES SERPL QL SCN: NEGATIVE
BARBITURATES UR QL: NEGATIVE
BENZODIAZ UR QL: NEGATIVE
COCAINE UR QL: NEGATIVE
ETHANOL EXG-MCNC: 0 MG/DL
OPIATES UR QL SCN: NEGATIVE
OXYCODONE+OXYMORPHONE UR QL SCN: NEGATIVE
PCP UR QL: NEGATIVE
THC UR QL: POSITIVE

## 2023-12-03 PROCEDURE — 80307 DRUG TEST PRSMV CHEM ANLYZR: CPT | Performed by: INTERNAL MEDICINE

## 2023-12-03 PROCEDURE — 99283 EMERGENCY DEPT VISIT LOW MDM: CPT

## 2023-12-03 PROCEDURE — 99285 EMERGENCY DEPT VISIT HI MDM: CPT

## 2023-12-03 PROCEDURE — 82075 ASSAY OF BREATH ETHANOL: CPT | Performed by: INTERNAL MEDICINE

## 2023-12-03 RX ADMIN — NICOTINE 7 MG/24 HR DAILY TRANSDERMAL PATCH 7 MG: at 20:37

## 2023-12-03 RX ADMIN — NICOTINE 7 MG/24 HR DAILY TRANSDERMAL PATCH 7 MG: at 14:44
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
## 2023-12-03 NOTE — ED NOTES
Dates on petitioner page of 302 corrected. Document sent to [email or web address]. Jesús Carrillo confirmed it was received.
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
## 2023-12-03 NOTE — ED NOTES
[place] PROMISe indicates:  Eligible.   Recipient #[id number]   Plan is [address] [facility]

## 2023-12-03 NOTE — ED NOTES
Pt ambulatory to bathroom to change and provide urine sample.      Sharmin Beverly RN  12/03/23 7215
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
## 2023-12-04 ENCOUNTER — HOSPITAL ENCOUNTER (INPATIENT)
Facility: HOSPITAL | Age: 28
LOS: 10 days | Discharge: HOME | DRG: 753 | End: 2023-12-14
Attending: STUDENT IN AN ORGANIZED HEALTH CARE EDUCATION/TRAINING PROGRAM | Admitting: STUDENT IN AN ORGANIZED HEALTH CARE EDUCATION/TRAINING PROGRAM
Payer: COMMERCIAL

## 2023-12-04 VITALS
DIASTOLIC BLOOD PRESSURE: 70 MMHG | SYSTOLIC BLOOD PRESSURE: 118 MMHG | OXYGEN SATURATION: 98 % | RESPIRATION RATE: 18 BRPM | TEMPERATURE: 97.9 F | HEART RATE: 92 BPM

## 2023-12-04 DIAGNOSIS — E55.9 VITAMIN D DEFICIENCY: ICD-10-CM

## 2023-12-04 DIAGNOSIS — F29 SCHIZOPHRENIA SPECTRUM DISORDER WITH PSYCHOTIC DISORDER TYPE NOT YET DETERMINED (HCC): Primary | ICD-10-CM

## 2023-12-04 DIAGNOSIS — Z00.8 MEDICAL CLEARANCE FOR PSYCHIATRIC ADMISSION: ICD-10-CM

## 2023-12-04 DIAGNOSIS — F29 PSYCHOSIS (HCC): ICD-10-CM

## 2023-12-04 DIAGNOSIS — F12.20: ICD-10-CM

## 2023-12-04 PROBLEM — F11.21: Status: ACTIVE | Noted: 2023-12-04

## 2023-12-04 PROCEDURE — NC001 PR NO CHARGE: Performed by: EMERGENCY MEDICINE

## 2023-12-04 PROCEDURE — 99245 OFF/OP CONSLTJ NEW/EST HI 55: CPT | Performed by: PSYCHIATRY & NEUROLOGY

## 2023-12-04 RX ORDER — HALOPERIDOL 5 MG/ML
2.5 INJECTION INTRAMUSCULAR
Status: DISCONTINUED | OUTPATIENT
Start: 2023-12-04 | End: 2023-12-14 | Stop reason: HOSPADM

## 2023-12-04 RX ORDER — RISPERIDONE 1 MG/1
1 TABLET ORAL
Status: DISCONTINUED | OUTPATIENT
Start: 2023-12-04 | End: 2023-12-14 | Stop reason: HOSPADM

## 2023-12-04 RX ORDER — HYDROXYZINE 50 MG/1
50 TABLET, FILM COATED ORAL
Status: DISCONTINUED | OUTPATIENT
Start: 2023-12-04 | End: 2023-12-14 | Stop reason: HOSPADM

## 2023-12-04 RX ORDER — AMOXICILLIN 250 MG
1 CAPSULE ORAL DAILY PRN
Status: CANCELLED | OUTPATIENT
Start: 2023-12-04

## 2023-12-04 RX ORDER — LORAZEPAM 2 MG/ML
2 INJECTION INTRAMUSCULAR EVERY 6 HOURS PRN
Status: DISCONTINUED | OUTPATIENT
Start: 2023-12-04 | End: 2023-12-14 | Stop reason: HOSPADM

## 2023-12-04 RX ORDER — HYDROXYZINE HYDROCHLORIDE 25 MG/1
25 TABLET, FILM COATED ORAL
Status: CANCELLED | OUTPATIENT
Start: 2023-12-04

## 2023-12-04 RX ORDER — LANOLIN ALCOHOL/MO/W.PET/CERES
3 CREAM (GRAM) TOPICAL
Status: DISCONTINUED | OUTPATIENT
Start: 2023-12-04 | End: 2023-12-14 | Stop reason: HOSPADM

## 2023-12-04 RX ORDER — HALOPERIDOL 5 MG/ML
2.5 INJECTION INTRAMUSCULAR
Status: CANCELLED | OUTPATIENT
Start: 2023-12-04

## 2023-12-04 RX ORDER — HALOPERIDOL 5 MG/ML
5 INJECTION INTRAMUSCULAR
Status: DISCONTINUED | OUTPATIENT
Start: 2023-12-04 | End: 2023-12-14 | Stop reason: HOSPADM

## 2023-12-04 RX ORDER — HYDROXYZINE HYDROCHLORIDE 25 MG/1
50 TABLET, FILM COATED ORAL
Status: CANCELLED | OUTPATIENT
Start: 2023-12-04

## 2023-12-04 RX ORDER — BENZTROPINE MESYLATE 1 MG/1
1 TABLET ORAL
Status: DISCONTINUED | OUTPATIENT
Start: 2023-12-04 | End: 2023-12-14 | Stop reason: HOSPADM

## 2023-12-04 RX ORDER — BENZTROPINE MESYLATE 1 MG/ML
0.5 INJECTION INTRAMUSCULAR; INTRAVENOUS
Status: CANCELLED | OUTPATIENT
Start: 2023-12-04

## 2023-12-04 RX ORDER — LORAZEPAM 2 MG/ML
1 INJECTION INTRAMUSCULAR
Status: DISCONTINUED | OUTPATIENT
Start: 2023-12-04 | End: 2023-12-14 | Stop reason: HOSPADM

## 2023-12-04 RX ORDER — MAGNESIUM HYDROXIDE/ALUMINUM HYDROXICE/SIMETHICONE 120; 1200; 1200 MG/30ML; MG/30ML; MG/30ML
30 SUSPENSION ORAL EVERY 4 HOURS PRN
Status: DISCONTINUED | OUTPATIENT
Start: 2023-12-04 | End: 2023-12-14 | Stop reason: HOSPADM

## 2023-12-04 RX ORDER — RISPERIDONE 0.25 MG/1
0.25 TABLET ORAL
Status: DISCONTINUED | OUTPATIENT
Start: 2023-12-04 | End: 2023-12-14 | Stop reason: HOSPADM

## 2023-12-04 RX ORDER — ACETAMINOPHEN 325 MG/1
975 TABLET ORAL EVERY 6 HOURS PRN
Status: CANCELLED | OUTPATIENT
Start: 2023-12-04

## 2023-12-04 RX ORDER — HYDROXYZINE HYDROCHLORIDE 25 MG/1
25 TABLET, FILM COATED ORAL
Status: DISCONTINUED | OUTPATIENT
Start: 2023-12-04 | End: 2023-12-14 | Stop reason: HOSPADM

## 2023-12-04 RX ORDER — PROPRANOLOL HYDROCHLORIDE 10 MG/1
10 TABLET ORAL EVERY 8 HOURS PRN
Status: DISCONTINUED | OUTPATIENT
Start: 2023-12-04 | End: 2023-12-14 | Stop reason: HOSPADM

## 2023-12-04 RX ORDER — ACETAMINOPHEN 325 MG/1
650 TABLET ORAL EVERY 6 HOURS PRN
Status: CANCELLED | OUTPATIENT
Start: 2023-12-04

## 2023-12-04 RX ORDER — PROPRANOLOL HYDROCHLORIDE 20 MG/1
10 TABLET ORAL EVERY 8 HOURS PRN
Status: CANCELLED | OUTPATIENT
Start: 2023-12-04

## 2023-12-04 RX ORDER — POLYETHYLENE GLYCOL 3350 17 G/17G
17 POWDER, FOR SOLUTION ORAL DAILY PRN
Status: DISCONTINUED | OUTPATIENT
Start: 2023-12-04 | End: 2023-12-14 | Stop reason: HOSPADM

## 2023-12-04 RX ORDER — LORAZEPAM 2 MG/ML
2 INJECTION INTRAMUSCULAR
Status: DISCONTINUED | OUTPATIENT
Start: 2023-12-04 | End: 2023-12-14 | Stop reason: HOSPADM

## 2023-12-04 RX ORDER — DIPHENHYDRAMINE HYDROCHLORIDE 50 MG/ML
50 INJECTION INTRAMUSCULAR; INTRAVENOUS EVERY 6 HOURS PRN
Status: CANCELLED | OUTPATIENT
Start: 2023-12-04

## 2023-12-04 RX ORDER — LORAZEPAM 2 MG/ML
2 INJECTION INTRAMUSCULAR EVERY 6 HOURS PRN
Status: CANCELLED | OUTPATIENT
Start: 2023-12-04

## 2023-12-04 RX ORDER — DIPHENHYDRAMINE HYDROCHLORIDE 50 MG/ML
50 INJECTION INTRAMUSCULAR; INTRAVENOUS EVERY 6 HOURS PRN
Status: DISCONTINUED | OUTPATIENT
Start: 2023-12-04 | End: 2023-12-14 | Stop reason: HOSPADM

## 2023-12-04 RX ORDER — ACETAMINOPHEN 325 MG/1
650 TABLET ORAL EVERY 6 HOURS PRN
Status: DISCONTINUED | OUTPATIENT
Start: 2023-12-04 | End: 2023-12-14 | Stop reason: HOSPADM

## 2023-12-04 RX ORDER — BENZTROPINE MESYLATE 1 MG/ML
1 INJECTION INTRAMUSCULAR; INTRAVENOUS
Status: CANCELLED | OUTPATIENT
Start: 2023-12-04

## 2023-12-04 RX ORDER — HYDROXYZINE HYDROCHLORIDE 25 MG/1
100 TABLET, FILM COATED ORAL
Status: CANCELLED | OUTPATIENT
Start: 2023-12-04

## 2023-12-04 RX ORDER — RISPERIDONE 0.5 MG/1
0.5 TABLET ORAL
Status: DISCONTINUED | OUTPATIENT
Start: 2023-12-04 | End: 2023-12-14 | Stop reason: HOSPADM

## 2023-12-04 RX ORDER — AMOXICILLIN 250 MG
1 CAPSULE ORAL DAILY PRN
Status: DISCONTINUED | OUTPATIENT
Start: 2023-12-04 | End: 2023-12-14 | Stop reason: HOSPADM

## 2023-12-04 RX ORDER — HYDROXYZINE 50 MG/1
100 TABLET, FILM COATED ORAL
Status: DISCONTINUED | OUTPATIENT
Start: 2023-12-04 | End: 2023-12-14 | Stop reason: HOSPADM

## 2023-12-04 RX ORDER — LORAZEPAM 2 MG/ML
2 INJECTION INTRAMUSCULAR
Status: CANCELLED | OUTPATIENT
Start: 2023-12-04

## 2023-12-04 RX ORDER — POLYETHYLENE GLYCOL 3350 17 G/17G
17 POWDER, FOR SOLUTION ORAL DAILY PRN
Status: CANCELLED | OUTPATIENT
Start: 2023-12-04

## 2023-12-04 RX ORDER — LANOLIN ALCOHOL/MO/W.PET/CERES
3 CREAM (GRAM) TOPICAL
Status: CANCELLED | OUTPATIENT
Start: 2023-12-04

## 2023-12-04 RX ORDER — BENZTROPINE MESYLATE 1 MG/ML
1 INJECTION INTRAMUSCULAR; INTRAVENOUS
Status: DISCONTINUED | OUTPATIENT
Start: 2023-12-04 | End: 2023-12-14 | Stop reason: HOSPADM

## 2023-12-04 RX ORDER — RISPERIDONE 0.25 MG/1
0.5 TABLET ORAL
Status: CANCELLED | OUTPATIENT
Start: 2023-12-04

## 2023-12-04 RX ORDER — LORAZEPAM 2 MG/ML
1 INJECTION INTRAMUSCULAR
Status: CANCELLED | OUTPATIENT
Start: 2023-12-04

## 2023-12-04 RX ORDER — HALOPERIDOL 5 MG/ML
5 INJECTION INTRAMUSCULAR
Status: CANCELLED | OUTPATIENT
Start: 2023-12-04

## 2023-12-04 RX ORDER — BENZTROPINE MESYLATE 1 MG/1
1 TABLET ORAL
Status: CANCELLED | OUTPATIENT
Start: 2023-12-04

## 2023-12-04 RX ORDER — RISPERIDONE 0.25 MG/1
0.25 TABLET ORAL
Status: CANCELLED | OUTPATIENT
Start: 2023-12-04

## 2023-12-04 RX ORDER — MAGNESIUM HYDROXIDE/ALUMINUM HYDROXICE/SIMETHICONE 120; 1200; 1200 MG/30ML; MG/30ML; MG/30ML
30 SUSPENSION ORAL EVERY 4 HOURS PRN
Status: CANCELLED | OUTPATIENT
Start: 2023-12-04

## 2023-12-04 RX ORDER — RISPERIDONE 1 MG/1
1 TABLET ORAL
Status: CANCELLED | OUTPATIENT
Start: 2023-12-04

## 2023-12-04 RX ORDER — BENZTROPINE MESYLATE 1 MG/ML
0.5 INJECTION INTRAMUSCULAR; INTRAVENOUS
Status: DISCONTINUED | OUTPATIENT
Start: 2023-12-04 | End: 2023-12-14 | Stop reason: HOSPADM

## 2023-12-04 RX ORDER — ACETAMINOPHEN 325 MG/1
650 TABLET ORAL EVERY 4 HOURS PRN
Status: DISCONTINUED | OUTPATIENT
Start: 2023-12-04 | End: 2023-12-14 | Stop reason: HOSPADM

## 2023-12-04 RX ORDER — NICOTINE 21 MG/24HR
21 PATCH, TRANSDERMAL 24 HOURS TRANSDERMAL ONCE
Status: DISCONTINUED | OUTPATIENT
Start: 2023-12-04 | End: 2023-12-04 | Stop reason: HOSPADM

## 2023-12-04 RX ORDER — ACETAMINOPHEN 325 MG/1
650 TABLET ORAL EVERY 4 HOURS PRN
Status: CANCELLED | OUTPATIENT
Start: 2023-12-04

## 2023-12-04 RX ORDER — ACETAMINOPHEN 325 MG/1
975 TABLET ORAL EVERY 6 HOURS PRN
Status: DISCONTINUED | OUTPATIENT
Start: 2023-12-04 | End: 2023-12-14 | Stop reason: HOSPADM

## 2023-12-04 RX ADMIN — NICOTINE 21 MG: 21 PATCH TRANSDERMAL at 09:33

## 2023-12-04 NOTE — ED NOTES
Patient showered, teeth brushed, scrubs changed at this time.       Konrad Sinclair RN  12/04/23 6496

## 2023-12-04 NOTE — ED NOTES
[facility]: willing to review, clinical faxed    [facility]: no MA beds    [place]: no MA beds    [place]: no MA beds    [place]: no MA beds    [facility]: no MA beds    [place]: no MA beds
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
## 2023-12-04 NOTE — ED NOTES
Mental Health Team is inviting you to a scheduled Zoom meeting. [address]   [email or web address]   Meeting ID:    [email or web address]: [id number]   One tap mobile   [phone],,,,*[phone]# [address] ([place])   Dial by your location           [phone] ([place])           [phone] [facility] ([place])           +1 [address] ([place])           +[phone] [facility]           +1 [phone] [address] ([address] [place] [address]'S Way ([address])           +1 [address] ([place])   Meeting ID: [id number]   Find your local number: [email or web address]   Join by Spotlight   [email or web address]   Join by LETITIA   [de-identified] ([address] [facility])   [de-identified] ([address] [facility])   Meeting ID:    [email or web address]: [id number]

## 2023-12-04 NOTE — ED NOTES
Assumed care of patient at this time, patient noted to be awake, appears in no distress 1:1 at bedside     Junior Jaramillo RN  12/04/23 0700

## 2023-12-04 NOTE — EMTALA/ACUTE CARE TRANSFER
[address] [place], [place] [address]  Dept: [phone]      EMTALA TRANSFER CONSENT    NAME Luciano Small                                         1995                              MRN [id number]    I have been informed of my rights regarding examination, treatment, and transfer   by Dr. Queenie Albright MD    Benefits: Specialized equipment and/or services available at the receiving facility (Include comment)________________________ (Psychiatry)    Risks: Potential for delay in receiving treatment, Potential deterioration of medical condition, Increased discomfort during transfer, Possible worsening of condition or death during transfer      Consent for Transfer:  I acknowledge that my medical condition has been evaluated and explained to me by the emergency department physician or other qualified medical person and/or my attending physician, who has recommended that I be transferred to the service of  Accepting Physician: Dr Catarina Lr at [facility] Name, [address] : [facility] [address]. The above potential benefits of such transfer, the potential risks associated with such transfer, and the probable risks of not being transferred have been explained to me, and I fully understand them. The doctor has explained that, in my case, the benefits of transfer outweigh the risks. I agree to be transferred. I authorize the performance of emergency medical procedures and treatments upon me in both transit and upon arrival at the receiving facility. Additionally, I authorize the release of any and all medical records to the receiving facility and request they be transported with me, if possible. I understand that the safest mode of transportation during a medical emergency is an ambulance and that the Hospital advocates the use of this mode of transport.  Risks of traveling to the receiving facility by car, including absence of medical control, life sustaining equipment, such as oxygen, and medical personnel has been explained to me and I fully understand them. (EZEQUIEL CORRECT BOX BELOW)  [  ]  I consent to the stated transfer and to be transported by ambulance/helicopter. [  ]  I consent to the stated transfer, but refuse transportation by ambulance and accept full responsibility for my transportation by car. I understand the risks of non-ambulance transfers and I exonerate the Hospital and its staff from any deterioration in my condition that results from this refusal.    X___________________________________________    DATE  23  TIME________  Signature of patient or legally responsible individual signing on patient behalf           RELATIONSHIP TO PATIENT_________________________          Provider Certification    NAME Zac BUSTILLOS 1995                              MRN [id number]    A medical screening exam was performed on the above named patient. Based on the examination:    Condition Necessitating Transfer The primary encounter diagnosis was Encounter for psychological evaluation. Diagnoses of Agitation, Hallucination, and Medical clearance for psychiatric admission were also pertinent to this visit.     Patient Condition: The patient has been stabilized such that within reasonable medical probability, no material deterioration of the patient condition or the condition of the unborn child(yarely) is likely to result from the transfer    Reason for Transfer: Level of Care needed not available at this facility    Transfer Requirements: [address] available and qualified personnel available for treatment as acknowledged by isai  Agreed to accept transfer and to provide appropriate medical treatment as acknowledged by        [facility] ON THE [facility]  Appropriate medical records of the examination and treatment of the patient are provided at the time of transfer   [address] _______  Transfer will be performed by qualified personnel from [facility]  and appropriate transfer equipment as required, including the use of necessary and appropriate life support measures. Provider Certification: I have examined the patient and explained the following risks and benefits of being transferred/refusing transfer to the patient/family:  General risk, such as traffic hazards, adverse weather conditions, rough terrain or turbulence, possible failure of equipment (including vehicle or aircraft), or consequences of actions of persons outside the control of the transport personnel, Unanticipated needs of medical equipment and personnel during transport, Risk of worsening condition, The possibility of a transport vehicle being unavailable, The patient is stable for psychiatric transfer because they are medically stable, and is protected from harming him/herself or others during transport      Based on these reasonable risks and benefits to the patient and/or the unborn child(yarely), and based upon the information available at the time of the patient’s examination, I certify that the medical benefits reasonably to be expected from the provision of appropriate medical treatments at another medical facility outweigh the increasing risks, if any, to the individual’s medical condition, and in the case of labor to the unborn child, from effecting the transfer.     X____________________________________________ DATE 12/04/23        TIME_______      ORIGINAL - SEND TO MEDICAL RECORDS   COPY - SEND WITH PATIENT DURING TRANSFER

## 2023-12-04 NOTE — ED NOTES
Transport pushed back to [phone]. Dinner tray ordered a this time.       Malik Redd RN  12/04/23 0213

## 2023-12-04 NOTE — ED NOTES
Psychiatry resident Ankur Silva talking with patient via Ipad at this time.       Magdalene Aldana RN  12/04/23 6573

## 2023-12-04 NOTE — ED NOTES
[facility]: unable to accept patient at this time    Bed search to continue        0507:    [facility]: no MA beds    [facility]: willing to review, clinical faxed patient

## 2023-12-04 NOTE — ED NOTES
Pt sleeping, vitals deferred at this time - will obtain once awake. Pt has even respirations and no signs of distress.      Corinne Favor, RN  12/04/23 0193

## 2023-12-04 NOTE — ED NOTES
Patient is accepted at [facility]  Patient is accepted by Dr. Geri Osborne  per  [address] is arranged with [facility]  Transportation is scheduled for TBD    Nurse report is to be called to [phone]   prior to patient transfer.

## 2023-12-04 NOTE — ED NOTES
Assumed care of patient at this time. Patient appears to be sleeping on stretcher. Respirations spontaneous, equal and non-labored. Patient shows no outward signs of distress. 1:1, Shai Mon, ED Tech, remains at bedside for continual observation.       Jessica Frias RN  12/04/23 3600

## 2023-12-04 NOTE — ED NOTES
[facility] [address] [facility] [address].       Lisa Aguillon RN  12/04/23 1028

## 2023-12-05 PROBLEM — E43 SEVERE PROTEIN-CALORIE MALNUTRITION (HCC): Status: ACTIVE | Noted: 2023-12-05

## 2023-12-05 PROBLEM — F10.10 ALCOHOL ABUSE: Status: ACTIVE | Noted: 2023-12-05

## 2023-12-05 PROBLEM — Z00.8 MEDICAL CLEARANCE FOR PSYCHIATRIC ADMISSION: Status: ACTIVE | Noted: 2023-12-05

## 2023-12-05 PROBLEM — E55.9 VITAMIN D DEFICIENCY: Status: ACTIVE | Noted: 2023-12-05

## 2023-12-05 PROBLEM — F17.290 VAPING NICOTINE DEPENDENCE, TOBACCO PRODUCT: Status: ACTIVE | Noted: 2023-12-05

## 2023-12-05 LAB
25(OH)D3 SERPL-MCNC: 14.2 NG/ML (ref 30–100)
ALBUMIN SERPL BCP-MCNC: 4.9 G/DL (ref 3.5–5)
ALP SERPL-CCNC: 42 U/L (ref 34–104)
ALT SERPL W P-5'-P-CCNC: 11 U/L (ref 7–52)
ANION GAP SERPL CALCULATED.3IONS-SCNC: 6 MMOL/L
AST SERPL W P-5'-P-CCNC: 17 U/L (ref 13–39)
ATRIAL RATE: 81 BPM
ATRIAL RATE: 82 BPM
BACTERIA UR QL AUTO: ABNORMAL /HPF
BASOPHILS # BLD AUTO: 0.06 THOUSANDS/ÂΜL (ref 0–0.1)
BASOPHILS NFR BLD AUTO: 1 % (ref 0–1)
BILIRUB SERPL-MCNC: 0.67 MG/DL (ref 0.2–1)
BILIRUB UR QL STRIP: NEGATIVE
BUN SERPL-MCNC: 10 MG/DL (ref 5–25)
CALCIUM SERPL-MCNC: 9.7 MG/DL (ref 8.4–10.2)
CHLORIDE SERPL-SCNC: 106 MMOL/L (ref 96–108)
CHOLEST SERPL-MCNC: 139 MG/DL
CLARITY UR: CLEAR
CO2 SERPL-SCNC: 29 MMOL/L (ref 21–32)
COLOR UR: ABNORMAL
CREAT SERPL-MCNC: 1 MG/DL (ref 0.6–1.3)
EOSINOPHIL # BLD AUTO: 0.3 THOUSAND/ÂΜL (ref 0–0.61)
EOSINOPHIL NFR BLD AUTO: 5 % (ref 0–6)
ERYTHROCYTE [DISTWIDTH] IN BLOOD BY AUTOMATED COUNT: 12.1 % (ref 11.6–15.1)
FOLATE SERPL-MCNC: 9.7 NG/ML
GFR SERPL CREATININE-BSD FRML MDRD: 100 ML/MIN/1.73SQ M
GLUCOSE P FAST SERPL-MCNC: 91 MG/DL (ref 65–99)
GLUCOSE SERPL-MCNC: 91 MG/DL (ref 65–140)
GLUCOSE UR STRIP-MCNC: NEGATIVE MG/DL
HCT VFR BLD AUTO: 43.5 % (ref 36.5–49.3)
HDLC SERPL-MCNC: 60 MG/DL
HGB BLD-MCNC: 14 G/DL (ref 12–17)
HGB UR QL STRIP.AUTO: NEGATIVE
IMM GRANULOCYTES # BLD AUTO: 0.01 THOUSAND/UL (ref 0–0.2)
IMM GRANULOCYTES NFR BLD AUTO: 0 % (ref 0–2)
KETONES UR STRIP-MCNC: NEGATIVE MG/DL
LDLC SERPL CALC-MCNC: 70 MG/DL (ref 0–100)
LEUKOCYTE ESTERASE UR QL STRIP: NEGATIVE
LYMPHOCYTES # BLD AUTO: 3.08 THOUSANDS/ÂΜL (ref 0.6–4.47)
LYMPHOCYTES NFR BLD AUTO: 45 % (ref 14–44)
MAGNESIUM SERPL-MCNC: 2.3 MG/DL (ref 1.9–2.7)
MCH RBC QN AUTO: 29.6 PG (ref 26.8–34.3)
MCHC RBC AUTO-ENTMCNC: 32.2 G/DL (ref 31.4–37.4)
MCV RBC AUTO: 92 FL (ref 82–98)
MONOCYTES # BLD AUTO: 0.46 THOUSAND/ÂΜL (ref 0.17–1.22)
MONOCYTES NFR BLD AUTO: 7 % (ref 4–12)
MUCOUS THREADS UR QL AUTO: ABNORMAL
NEUTROPHILS # BLD AUTO: 2.8 THOUSANDS/ÂΜL (ref 1.85–7.62)
NEUTS SEG NFR BLD AUTO: 42 % (ref 43–75)
NITRITE UR QL STRIP: NEGATIVE
NON-SQ EPI CELLS URNS QL MICRO: ABNORMAL /HPF
NONHDLC SERPL-MCNC: 79 MG/DL
NRBC BLD AUTO-RTO: 0 /100 WBCS
P AXIS: 81 DEGREES
P AXIS: 82 DEGREES
PH UR STRIP.AUTO: 6 [PH]
PHOSPHATE SERPL-MCNC: 3.9 MG/DL (ref 2.7–4.5)
PLATELET # BLD AUTO: 220 THOUSANDS/UL (ref 149–390)
PMV BLD AUTO: 10.7 FL (ref 8.9–12.7)
POTASSIUM SERPL-SCNC: 3.8 MMOL/L (ref 3.5–5.3)
PR INTERVAL: 136 MS
PR INTERVAL: 138 MS
PROT SERPL-MCNC: 7.1 G/DL (ref 6.4–8.4)
PROT UR STRIP-MCNC: ABNORMAL MG/DL
QRS AXIS: 96 DEGREES
QRS AXIS: 97 DEGREES
QRSD INTERVAL: 70 MS
QRSD INTERVAL: 76 MS
QT INTERVAL: 358 MS
QT INTERVAL: 360 MS
QTC INTERVAL: 415 MS
QTC INTERVAL: 420 MS
RBC # BLD AUTO: 4.73 MILLION/UL (ref 3.88–5.62)
RBC #/AREA URNS AUTO: ABNORMAL /HPF
SODIUM SERPL-SCNC: 141 MMOL/L (ref 135–147)
SP GR UR STRIP.AUTO: 1.02 (ref 1–1.04)
T WAVE AXIS: 61 DEGREES
T WAVE AXIS: 65 DEGREES
TREPONEMA PALLIDUM IGG+IGM AB [PRESENCE] IN SERUM OR PLASMA BY IMMUNOASSAY: NORMAL
TRIGL SERPL-MCNC: 46 MG/DL
TSH SERPL DL<=0.05 MIU/L-ACNC: 1.49 UIU/ML (ref 0.45–4.5)
UROBILINOGEN UA: NEGATIVE MG/DL
VENTRICULAR RATE: 81 BPM
VENTRICULAR RATE: 82 BPM
VIT B12 SERPL-MCNC: 1031 PG/ML (ref 180–914)
WBC # BLD AUTO: 6.71 THOUSAND/UL (ref 4.31–10.16)
WBC #/AREA URNS AUTO: ABNORMAL /HPF

## 2023-12-05 PROCEDURE — 82746 ASSAY OF FOLIC ACID SERUM: CPT

## 2023-12-05 PROCEDURE — 80061 LIPID PANEL: CPT

## 2023-12-05 PROCEDURE — 80053 COMPREHEN METABOLIC PANEL: CPT

## 2023-12-05 PROCEDURE — 82306 VITAMIN D 25 HYDROXY: CPT

## 2023-12-05 PROCEDURE — 84100 ASSAY OF PHOSPHORUS: CPT

## 2023-12-05 PROCEDURE — 85025 COMPLETE CBC W/AUTO DIFF WBC: CPT

## 2023-12-05 PROCEDURE — 83735 ASSAY OF MAGNESIUM: CPT

## 2023-12-05 PROCEDURE — 81001 URINALYSIS AUTO W/SCOPE: CPT

## 2023-12-05 PROCEDURE — 99223 1ST HOSP IP/OBS HIGH 75: CPT | Performed by: STUDENT IN AN ORGANIZED HEALTH CARE EDUCATION/TRAINING PROGRAM

## 2023-12-05 PROCEDURE — 84443 ASSAY THYROID STIM HORMONE: CPT

## 2023-12-05 PROCEDURE — 99253 IP/OBS CNSLTJ NEW/EST LOW 45: CPT | Performed by: NURSE PRACTITIONER

## 2023-12-05 PROCEDURE — 86780 TREPONEMA PALLIDUM: CPT

## 2023-12-05 PROCEDURE — 93005 ELECTROCARDIOGRAM TRACING: CPT

## 2023-12-05 PROCEDURE — 82607 VITAMIN B-12: CPT

## 2023-12-05 RX ORDER — NICOTINE 21 MG/24HR
1 PATCH, TRANSDERMAL 24 HOURS TRANSDERMAL DAILY
Status: DISCONTINUED | OUTPATIENT
Start: 2023-12-05 | End: 2023-12-14 | Stop reason: HOSPADM

## 2023-12-05 RX ORDER — ERGOCALCIFEROL 1.25 MG/1
50000 CAPSULE ORAL WEEKLY
Status: DISCONTINUED | OUTPATIENT
Start: 2023-12-05 | End: 2023-12-14 | Stop reason: HOSPADM

## 2023-12-05 RX ADMIN — ERGOCALCIFEROL 50000 UNITS: 1.25 CAPSULE ORAL at 14:15

## 2023-12-05 RX ADMIN — NICOTINE 1 PATCH: 21 PATCH, EXTENDED RELEASE TRANSDERMAL at 12:11
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
## 2023-12-05 NOTE — ASSESSMENT & PLAN NOTE
Patient has a longstanding history of alcohol abuse greater than 1 year  Patient has had 2 DUIs and been incarcerated  Alcohol cessation education

## 2023-12-05 NOTE — CONSULTS
[facility]  Consult  Name: Kary Foss 25 y.o. male I MRN: [id number]  Unit/Bed#: [address] 228-06 I Date of Admission: 12/4/2023   Date of Service: 12/5/2023 I Hospital Day: 1    Inpatient consult for Medical Clearance for [facility] patient  Consult performed by: CINDY June  Consult ordered by: Tutu Fry MD          Assessment/Plan   Vitamin D deficiency  Assessment & Plan  Patient's vitamin D level from today was 14.2  Patient will be started on ergocalciferol 50,000 units p.o. weekly x8 weeks  Patient will need to follow-up with PCP and have a repeat vitamin D level drawn in 8 to 12 weeks    Severe protein-calorie malnutrition ([address])  Assessment & Plan  Malnutrition Findings:   Adult Malnutrition type: Social/enviromental  Adult Degree of Malnutrition: Other severe protein calorie malnutrition  Malnutrition Characteristics: Fat loss, Muscle loss, Inadequate energy  Patient has a BMI of 16.21  Nutrition consult                  360 Statement: related to lack of resources for food as evidenced by weight loss,(pt reports >11% in 4 months) body fat loss- orbitals darkened and hollow, significant protrusion of clavicle treated with regular diet and Ensure supplements. BMI Findings: Body mass index is 16.21 kg/m².        Alcohol abuse  Assessment & Plan  Patient has a longstanding history of alcohol abuse greater than 1 year  Patient has had 2 DUIs and been incarcerated  Alcohol cessation education    Vaping nicotine dependence, tobacco product  Assessment & Plan  Patient vapes nicotine on a daily basis  Patient will use a nicotine patch for nicotine replacement therapy  Vaping cessation education    Medical clearance for psychiatric admission  Assessment & Plan  Vital signs stable afebrile patient seen and examined by myself Labs from today 12/5/2023 reviewed sodium 141 potassium 3.8 creatinine 1.0 vitamin D 14.2  Patient medically cleared for admit   IM will sign off please call with any questions or concerns    Severe cannabis use disorder [facility])  Assessment & Plan  Patient uses marijuana on a daily basis  Marijuana cessation education    Severe heroin use disorder in sustained remission [facility])  Assessment & Plan  Patient has a heroin use disorder has been in remission for the last 5 years             Counseling / Coordination of Care Time: 45 minutes. Greater than 50% of total time spent on patient counseling and coordination of care. Collaboration of Care: Were Recommendations Directly Discussed with Primary Treatment Team? - No     History of Present Illness:    Luciano Small is a 25 y.o. male who is originally admitted to the psychiatry service due to calling 911 for feeling unsafe living at his home with his mother. We are consulted for medical clearance for admission to [facility] and treatment of underlying psychiatric illness. Patient presents to [address] [place] [address] on 12/3/2023 after he called the [address] [place] [address] Rd 094 call 6 he felt safe with at home with his mother. Patient's mother also had called crisis slowly had a crisis to file 36 petition against the patient as well as attempting to get a PFA. Has past medical history of multiple substance abuse heroin abuse marijuana abuse vaping with nicotine alcohol abuse. Patient is malnourished with a BMI of 16.21 patient has been incarcerated for a DUI x2 as well as retail theft patient now has vitamin D deficiency. Review of Systems:    Review of Systems   Constitutional:  Negative for chills and fever. HENT:  Negative for ear pain and sore throat. Eyes:  Negative for pain and visual disturbance. Respiratory:  Negative for cough and shortness of breath. Cardiovascular:  Negative for chest pain and palpitations. Gastrointestinal:  Negative for abdominal pain and vomiting. Genitourinary:  Negative for dysuria and hematuria.    Musculoskeletal:  Negative for arthralgias and back pain. Skin:  Negative for color change and rash. Neurological:  Negative for seizures and syncope. All other systems reviewed and are negative. Past Medical and Surgical History:     Past Medical History:   Diagnosis Date    Addiction to drug ([address])     Hepatitis C     Substance abuse ([address])        No past surgical history on file. Meds/Allergies:    all medications and allergies reviewed    Allergies: No Known Allergies    Social History:     Marital Status: Single    Substance Use History:   Social History     Substance and Sexual Activity   Alcohol Use No    Alcohol/week: 1.0 standard drink of alcohol    Types: 1 Cans of beer per week    Comment: social     Social History     Tobacco Use   Smoking Status Some Days    Packs/day: 1.00    Years: 5.00    Total pack years: 5.00    Types: Cigarettes   Smokeless Tobacco Never     Social History     Substance and Sexual Activity   Drug Use Not Currently    Types: Heroin, Marijuana    Comment: former heroin user, [place] - Medical marijuana       Family History:    non-contributory    Physical Exam:     Vitals:   Blood Pressure: 115/72 (12/05/23 1102)  Pulse: 86 (12/05/23 1102)  Temperature: 98.5 °F (36.9 °C) (12/05/23 1102)  Temp Source: Temporal (12/05/23 1102)  Respirations: 16 (12/05/23 1102)  Height: 5' 10" (177.8 cm) (12/04/23 2008)  Weight - Scale: 51.3 kg (113 lb) (12/04/23 2008)  SpO2: 98 % (12/05/23 1102)    Physical Exam  HENT:      Head: Normocephalic and atraumatic. Nose: Nose normal.      Mouth/Throat:      Mouth: Mucous membranes are moist.   Eyes:      Extraocular Movements: Extraocular movements intact. Cardiovascular:      Rate and Rhythm: Normal rate and regular rhythm. Pulmonary:      Effort: Pulmonary effort is normal.      Breath sounds: Normal breath sounds. Abdominal:      General: Abdomen is flat. Bowel sounds are normal.   Musculoskeletal:         General: Normal range of motion.       Cervical back: Normal range of motion. Skin:     General: Skin is warm and dry. Neurological:      Mental Status: He is alert. Additional Data:     Lab Results: I have personally reviewed pertinent reports. Results from last 7 days   Lab Units 12/05/23  0624   WBC Thousand/uL 6.71   HEMOGLOBIN g/dL 14.0   HEMATOCRIT % 43.5   PLATELETS Thousands/uL 220   NEUTROS PCT % 42*   LYMPHS PCT % 45*   MONOS PCT % 7   EOS PCT % 5     Results from last 7 days   Lab Units 12/05/23  0624   SODIUM mmol/L 141   POTASSIUM mmol/L 3.8   CHLORIDE mmol/L 106   CO2 mmol/L 29   BUN mg/dL 10   CREATININE mg/dL 1.00   ANION GAP mmol/L 6   CALCIUM mg/dL 9.7   ALBUMIN g/dL 4.9   TOTAL BILIRUBIN mg/dL 0.67   ALK PHOS U/L 42   ALT U/L 11   AST U/L 17   GLUCOSE RANDOM mg/dL 91             No results found for: "HGBA1C"        EKG, Pathology, and Other Studies Reviewed on Admission:   EKG pending    ** Please Note: This note has been constructed using a voice recognition system. **    I have spent a total time of 45 minutes on 12/05/23 in caring for this patient including Diagnostic results, Prognosis, Risks and benefits of tx options, Instructions for management, Patient and family education, Importance of tx compliance, Risk factor reductions, Impressions, Counseling / Coordination of care, Documenting in the medical record, Reviewing / ordering tests, medicine, procedures  , Obtaining or reviewing history  , and Communicating with other healthcare professionals .

## 2023-12-05 NOTE — TREATMENT PLAN
TREATMENT PLAN REVIEW - [id number] 25 y.o. 1995 male MRN: [id number]    [address] Room / Bed: [place] [address] Encounter: [id number]          Admit Date/Time:  12/4/2023  7:51 PM    Treatment Team: Attending Provider: Jeremy Chen MD; Care Manager: Arcenio Carmona; Patient Care Assistant: Shyanne Benito; Patient Care Assistant: Sonya Felix;  Registered Nurse: Melania Mei RN; Patient Care Assistant: Vamsi Smith; : Yue Shafer    Diagnosis: Principal Problem:    Psychosis ([address])  Active Problems:    Severe heroin use disorder in sustained remission ([facility])    Severe cannabis use disorder ([address] [facility])    Medical clearance for psychiatric admission    Vaping nicotine dependence, tobacco product    Alcohol abuse    Severe protein-calorie malnutrition ([address] )    Vitamin D deficiency      Patient Strengths/Assets: average or above intelligence, communication skills, good physical health, negotiates basic needs    Patient Barriers/Limitations: difficulty adapting, financial instability, limited motivation, limited support system, noncompliant with treatment, patient is on an involuntary commitment, poor insight, poor interpersonal skills, poor reasoning ability, poor self-care    Short Term Goals: decrease in paranoid thoughts, decrease in psychotic symptoms, ability to stay safe on the unit, ability to stay free of restraints, improvement in ability to express basic needs, improvement in insight, improvement in reality testing, improvement in reasoning ability, improvement in self care, sleep improvement, improvement in appetite, mood stabilization, increase in group attendance, increase in socialization with peers on the unit, acceptance of need for psychiatric treatment, acceptance of psychiatric medications    Long Term Goals: stabilization of mood, free of suicidal thoughts, free of homicidal thoughts, no self abusive behavior, resolution of psychotic symptoms, improvement in reality testing, improvement in reasoning ability, improved insight, able to express basic needs, acceptance of need for psychiatric medications, acceptance of need for psychiatric treatment, acceptance of need for psychiatric follow up after discharge, acceptance of psychiatric diagnosis, adequate self care, adequate sleep, adequate appetite, appropriate interaction with peers, appropriate interaction with family, stable living arrangements upon discharge, establishment of family support upon discharge    Progress Towards Goals: progressing slowly, attends groups, participates in milieu therapy, reality testing remains poor, still has psychotic symptoms    Recommended Treatment: medication management, patient medication education, group therapy, milieu therapy, continued Behavioral Health psychiatric evaluation/assessment process    Treatment Frequency: daily medication monitoring, group and milieu therapy daily, monitoring through interdisciplinary rounds, monitoring through weekly patient care conferences    Expected Discharge Date:  12/15/23    Discharge Plan: discharge to home, referral for outpatient medication management with a psychiatrist, referral for outpatient psychotherapy    Treatment Plan Created/Updated By: Gisella Jacobs MD
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
## 2023-12-05 NOTE — ASSESSMENT & PLAN NOTE
Patient vapes nicotine on a daily basis  Patient will use a nicotine patch for nicotine replacement therapy  Vaping cessation education
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
## 2023-12-05 NOTE — PLAN OF CARE
Problem: Ineffective Coping  Goal: Participates in unit activities  Description: Interventions:  - Provide therapeutic environment   - Provide required programming   - Redirect inappropriate behaviors   Outcome: Progressing     Problem: Anxiety  Goal: Anxiety is at manageable level  Description: Interventions:  - Assess and monitor patient's anxiety level. - Monitor for signs and symptoms (heart palpitations, chest pain, shortness of breath, headaches, nausea, feeling jumpy, restlessness, irritable, apprehensive). - Collaborate with interdisciplinary team and initiate plan and interventions as ordered.   - [place] patient to unit/surroundings  - Explain treatment plan  - Encourage participation in care  - Encourage verbalization of concerns/fears  - Identify coping mechanisms  - Assist in developing anxiety-reducing skills  - Administer/offer alternative therapies  - Limit or eliminate stimulants  Outcome: Progressing     Problem: Risk for Violence/Aggression Toward Others  Goal: Refrain from harming others  Outcome: Not Progressing  Goal: Control angry outbursts  Description: Interventions:  - Monitor patient closely, per order  - Ensure early verbal de-escalation  - Monitor prn medication needs  - Set reasonable/therapeutic limits, outline behavioral expectations, and consequences   - Provide a non-threatening milieu, utilizing the least restrictive interventions   Outcome: Not Progressing     Problem: Alteration in Orientation  Goal: Express concerns related to confused thinking related to:  Description: Interventions:  - Encourage patient to express feelings, fears, frustrations, hopes  - Assign consistent caregivers   - [place]opa/re-orient patient as needed  - Allow comfort items, as appropriate  - Provide visual cues, signs, etc.   Outcome: Not Progressing  Goal: Allow medical examinations, as recommended  Description: Interventions:  - Provide physical/neurological exams and/or referrals, per provider Outcome: Progressing  Goal: Cooperate with recommended testing/procedures  Description: Interventions:  - Determine need for ancillary testing  - Observe for mental status changes  - Implement falls/precaution protocol   Outcome: Not Progressing

## 2023-12-05 NOTE — ASSESSMENT & PLAN NOTE
Malnutrition Findings:   Adult Malnutrition type: Social/enviromental  Adult Degree of Malnutrition: Other severe protein calorie malnutrition  Malnutrition Characteristics: Fat loss, Muscle loss, Inadequate energy  Patient has a BMI of 16.21  Nutrition consult                  360 Statement: related to lack of resources for food as evidenced by weight loss,(pt reports >11% in 4 months) body fat loss- orbitals darkened and hollow, significant protrusion of clavicle treated with regular diet and Ensure supplements. BMI Findings: Body mass index is 16.21 kg/m².

## 2023-12-05 NOTE — ED NOTES
CTS here for patient transport.  [facility] [place], [place]  12/04/23 1927

## 2023-12-05 NOTE — NURSING NOTE
Patient refused EKG. Has been visible in the milieu. Quiet and to himself. Slight irritable edge. Writing numbers on a piece of paper. "I'm writing code". Scant and guarded during interaction. Feels that he does not need to be here.
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
## 2023-12-05 NOTE — ASSESSMENT & PLAN NOTE
Vital signs stable afebrile patient seen and examined by myself Labs from today 12/5/2023 reviewed sodium 141 potassium 3.8 creatinine 1.0 vitamin D 14.2  Patient medically cleared for admit  SL IM will sign off please call with any questions or concerns

## 2023-12-05 NOTE — MALNUTRITION/BMI
This medical record reflects one or more clinical indicators suggestive of malnutrition and/or morbid obesity. Malnutrition Findings:   Adult Malnutrition type: Social/enviromental  Adult Degree of Malnutrition: Other severe protein calorie malnutrition  Malnutrition Characteristics: Fat loss, Muscle loss, Inadequate energy                  360 Statement: related to lack of resources for food as evidenced by weight loss,(pt reports >11% in 4 months) body fat loss- orbitals darkened and hollow, significant protrusion of clavicle treated with regular diet and Ensure supplements. BMI Findings: Body mass index is 16.21 kg/m². See Nutrition note dated 12/5/2023 for additional details. Completed nutrition assessment is viewable in the nutrition documentation.

## 2023-12-05 NOTE — ASSESSMENT & PLAN NOTE
Patient's vitamin D level from today was 14.2  Patient will be started on ergocalciferol 50,000 units p.o. weekly x8 weeks  Patient will need to follow-up with PCP and have a repeat vitamin D level drawn in 8 to 12 weeks

## 2023-12-06 PROCEDURE — 99233 SBSQ HOSP IP/OBS HIGH 50: CPT | Performed by: STUDENT IN AN ORGANIZED HEALTH CARE EDUCATION/TRAINING PROGRAM

## 2023-12-06 RX ORDER — RISPERIDONE 1 MG/1
1 TABLET ORAL
Status: DISCONTINUED | OUTPATIENT
Start: 2023-12-06 | End: 2023-12-14 | Stop reason: HOSPADM

## 2023-12-06 RX ADMIN — RISPERIDONE 1 MG: 1 TABLET ORAL at 21:11

## 2023-12-06 RX ADMIN — NICOTINE 1 PATCH: 21 PATCH, EXTENDED RELEASE TRANSDERMAL at 08:22
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
## 2023-12-06 NOTE — PLAN OF CARE
Problem: Ineffective Coping  Goal: Participates in unit activities  Description: Interventions:  - Provide therapeutic environment   - Provide required programming   - Redirect inappropriate behaviors   Outcome: Progressing     Problem: Anxiety  Goal: Anxiety is at manageable level  Description: Interventions:  - Assess and monitor patient's anxiety level. - Monitor for signs and symptoms (heart palpitations, chest pain, shortness of breath, headaches, nausea, feeling jumpy, restlessness, irritable, apprehensive). - Collaborate with interdisciplinary team and initiate plan and interventions as ordered.   - [place] patient to unit/surroundings  - Explain treatment plan  - Encourage participation in care  - Encourage verbalization of concerns/fears  - Identify coping mechanisms  - Assist in developing anxiety-reducing skills  - Administer/offer alternative therapies  - Limit or eliminate stimulants  Outcome: Progressing     Problem: Risk for Violence/Aggression Toward Others  Goal: Refrain from harming others  Outcome: Progressing  Goal: Control angry outbursts  Description: Interventions:  - Monitor patient closely, per order  - Ensure early verbal de-escalation  - Monitor prn medication needs  - Set reasonable/therapeutic limits, outline behavioral expectations, and consequences   - Provide a non-threatening milieu, utilizing the least restrictive interventions   Outcome: Progressing     Problem: Alteration in Orientation  Goal: Express concerns related to confused thinking related to:  Description: Interventions:  - Encourage patient to express feelings, fears, frustrations, hopes  - Assign consistent caregivers   - [place]/re-orient patient as needed  - Allow comfort items, as appropriate  - Provide visual cues, signs, etc.   Outcome: Progressing  Goal: Allow medical examinations, as recommended  Description: Interventions:  - Provide physical/neurological exams and/or referrals, per provider   Outcome: Progressing  Goal: Cooperate with recommended testing/procedures  Description: Interventions:  - Determine need for ancillary testing  - Observe for mental status changes  - Implement falls/precaution protocol   Outcome: Progressing     Problem: Nutrition/Hydration-ADULT  Goal: Nutrient/Hydration intake appropriate for improving, restoring or maintaining nutritional needs  Description: Monitor and assess patient's nutrition/hydration status for malnutrition. Collaborate with interdisciplinary team and initiate plan and interventions as ordered. Monitor patient's weight and dietary intake as ordered or per policy. Utilize nutrition screening tool and intervene as necessary. Determine patient's food preferences and provide high-protein, high-caloric foods as appropriate.      INTERVENTIONS:  - Monitor oral intake, urinary output, labs, and treatment plans  - Assess nutrition and hydration status and recommend course of action  - Evaluate amount of meals eaten  - Assist patient with eating if necessary   - Allow adequate time for meals  - Recommend/ encourage appropriate diets, oral nutritional supplements, and vitamin/mineral supplements  - Order, calculate, and assess calorie counts as needed  - Recommend, monitor, and adjust tube feedings and TPN/PPN based on assessed needs  - Assess need for intravenous fluids  - Provide specific nutrition/hydration education as appropriate  - Include patient/family/caregiver in decisions related to nutrition  Outcome: Progressing     Problem: DISCHARGE PLANNING  Goal: Discharge to home or other facility with appropriate resources  Description: INTERVENTIONS:  - Identify barriers to discharge w/patient and caregiver  - Arrange for needed discharge resources and transportation as appropriate  - Identify discharge learning needs (meds, wound care, etc.)  - Arrange for interpretive services to assist at discharge as needed  - Refer to Case Management Department for coordinating discharge planning if the patient needs post-hospital services based on physician/advanced practitioner order or complex needs related to functional status, cognitive ability, or social support system  Outcome: Progressing

## 2023-12-06 NOTE — NURSING NOTE
Pt asked about 72hour withdrawal process. It was explained that if MD does not agree that pt is safe to DC he could again be made involuntary. Pt stated understanding and thanked RN for information.

## 2023-12-06 NOTE — PROGRESS NOTES
Progress Note - [address] 25 y.o. male MRN: [id number]  Unit/Bed#: Vick Rushing 619-47 Encounter: [id number]    Subjective:   Per nursing report, patient has been overall well on the unit. He remains guarded and suspicious. He has been without acute behavioral issues. He does participate in groups. Patient is generally pleasant but superficial and guarded during interaction. He expresses understanding of involuntary commitment status. He is frustrated about being in the hospital. He continues to have negative and paranoid views about his mother. 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 He reports that his sleep is good and he becomes more guarded, scant, and irritable when he is asked about this. He denies any interest in taking medications. He denies SI, HI, or AVH. Discussed plan to start risperidone this evening.     Behavior over the last 24 hours:  unchanged  Sleep: normal  Appetite: normal  Medication side effects: Not yet started  ROS: all other systems are negative    Mental Status Evaluation:  Appearance:  marginal hygiene and slightly improved grooming   Behavior:  calm, somewhat cooperative, guarded, and bizarre   Speech:  normal rate and volume   Mood:  "okay"   Affect:  constricted   Thought Process:  linear   Associations: concrete associations   Thought Content:  some paranoia   Perceptual Disturbances: denies auditory or visual hallucinations when asked, does not appear responding to internal stimuli   Risk Potential: Suicidal ideation - None at present  Homicidal ideation - None at present  Potential for aggression - Not at present   Sensorium:  oriented to person, place, and time/date   Memory:  recent and remote memory grossly intact   Consciousness:  alert and awake   Attention/Concentration: attention span and concentration appear shorter than expected for age Insight:  limited   Judgment: limited   Gait/Station: normal gait/station, normal balance   Motor Activity: no abnormal movements     Medications: all current active meds have been reviewed.   Current Facility-Administered Medications   Medication Dose Route Frequency Provider Last Rate    acetaminophen  650 mg Oral Q6H PRN Carlita Guerrero MD      acetaminophen  650 mg Oral Q4H PRN Carlita Guerrero MD      acetaminophen  975 mg Oral Q6H PRN Carlita Guerrero MD      aluminum-magnesium hydroxide-simethicone  30 mL Oral Q4H PRN Carlita Guerrero MD      haloperidol lactate  2.5 mg Intramuscular Q4H PRN Max 4/day Carlita Guerrero MD      And    LORazepam  1 mg Intramuscular Q4H PRN Max 4/day Carlita Guerrero MD      And    benztropine  0.5 mg Intramuscular Q4H PRN Max 4/day Carlita Guerrero MD      haloperidol lactate  5 mg Intramuscular Q4H PRN Max 4/day Carlita Guerrero MD      And    LORazepam  2 mg Intramuscular Q4H PRN Max 4/day Carlita Guerrero MD      And    benztropine  1 mg Intramuscular Q4H PRN Max 4/day Carlita Guerrero MD      benztropine  1 mg Oral Q4H PRN Max 6/day Carlita Guerrero MD      hydrOXYzine HCL  50 mg Oral Q6H PRN Max 4/day Carlita Guerrero MD      Or    diphenhydrAMINE  50 mg Intramuscular Q6H PRN Carlita Guerrero MD      ergocalciferol  50,000 Units Oral Weekly CINDY Lazcano      glycerin-hypromellose-  1 drop Both Eyes Q3H PRN Carlita Guerrero MD      hydrOXYzine HCL  100 mg Oral Q6H PRN Max 4/day Carlita Guerrero MD      Or    LORazepam  2 mg Intramuscular Q6H PRN Carlita Guerrero MD      hydrOXYzine HCL  25 mg Oral Q6H PRN Max 4/day Carlita Guerrero MD      melatonin  3 mg Oral HS PRN Carlita Guerrero MD      nicotine  1 patch Transdermal Daily Victroino Cano MD      nicotine polacrilex  2 mg Oral Q2H PRN Carlita Guerrero MD      polyethylene glycol  17 g Oral Daily PRN Carlita Guerrero MD      propranolol  10 mg Oral Q8H PRN Carlita Guerrero MD      risperiDONE  0.25 mg Oral Q4H PRN Max 6/day Carlita Guerrero MD risperiDONE  0.5 mg Oral Q4H PRN Max 3/day Nam Trejo MD      risperiDONE  1 mg Oral Q4H PRN Max 6/day Nam Trejo MD      risperiDONE  1 mg Oral HS Horald Apgar, MD      senna-docusate sodium  1 tablet Oral Daily PRN Nam Trejo MD         Labs: I have personally reviewed all pertinent laboratory/tests results  Most Recent Labs:   Lab Results   Component Value Date    WBC 6.71 2023    RBC 4.73 2023    HGB 14.0 2023    HCT 43.5 2023     2023    RDW 12.1 2023    NEUTROABS 2.80 2023    SODIUM 141 2023    K 3.8 2023     2023    CO2 29 2023    BUN 10 2023    CREATININE 1.00 2023    GLUC 91 2023    CALCIUM 9.7 2023    AST 17 2023    ALT 11 2023    ALKPHOS 42 2023    TP 7.1 2023    ALB 4.9 2023    TBILI 0.67 2023    CHOLESTEROL 139 2023    HDL 60 2023    TRIG 46 2023    LDLCALC 70 2023    NONHDLC 79 2023    QDZ2DDNVGNVT 1.492 2023    SYPHILISAB Non-reactive 2023         Assessment/Plan   Principal Problem:    Psychosis ([address])  Active Problems:    Severe heroin use disorder in sustained remission (HCC)    Severe cannabis use disorder ([address])    Medical clearance for psychiatric admission    Vaping nicotine dependence, tobacco product    Alcohol abuse    Severe protein-calorie malnutrition ([address])    Vitamin D deficiency    Recommended Treatment:   Start risperidone 1 mg QHS. Continue all other medications at current doses as above.   Encourage group therapy, milieu therapy and occupational therapy  All current active medications have been reviewed  Encourage group therapy, milieu therapy and occupational therapy  Behavioral Health checks every 7 minutes  On 303 commitment    ----------------------------------------    Progress Toward Goals: progressing    Risks / Benefits of Treatment:    Risks, benefits, and possible side effects of medications explained to patient. Patient does not verbalize understanding of benefits or risks of treatment refusal at this time and needs ongoing explanation of treatment benefits and treatment plan. Counseling / Coordination of Care:    Patient's progress discussed with staff in treatment team meeting. Medications, treatment progress and treatment plan reviewed with patient.     Mabel Ziegler MD 12/06/23

## 2023-12-06 NOTE — NURSING NOTE
Received the patient at 1500. The patient is currently in the unit, engaged in the game of mPort with other peers. The patient requested that his key be sent with his friend. The patient's key were sent with his friend Melinda Layne , and the patient's belonging were updated accordingly. Patient Denies SI,HI,and AVH. Patient's pleasant when approached. .  Denies any unmeet needs.  Patient remain Q 7 min check

## 2023-12-06 NOTE — PLAN OF CARE
Problem: Ineffective Coping  Goal: Participates in unit activities  Description: Interventions:  - Provide therapeutic environment   - Provide required programming   - Redirect inappropriate behaviors   Outcome: Progressing   Patient has been attending grouops

## 2023-12-06 NOTE — PROGRESS NOTES
12/06/23 0840   Team Meeting   Meeting Type Daily Rounds   Team Members Present   Team Members Present Physician;Nurse;   Physician Team Member [facility] ON THE [facility]   Nursing Team Member [facility] Management Team Member Neetu   Patient/Family Present   Patient Present No   Patient's Family Present No     Pt denying all symptoms. Visible on the unit. Pt refusing to start any medications. Pt social with select peers. Discharge to be determined.

## 2023-12-07 PROCEDURE — 99232 SBSQ HOSP IP/OBS MODERATE 35: CPT | Performed by: STUDENT IN AN ORGANIZED HEALTH CARE EDUCATION/TRAINING PROGRAM

## 2023-12-07 RX ADMIN — RISPERIDONE 1 MG: 1 TABLET ORAL at 21:13

## 2023-12-07 RX ADMIN — NICOTINE 1 PATCH: 21 PATCH, EXTENDED RELEASE TRANSDERMAL at 08:16
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
## 2023-12-07 NOTE — PROGRESS NOTES
12/07/23 0838   Team Meeting   Meeting Type Daily Rounds   Team Members Present   Team Members Present Physician;Nurse;   Physician Team Member [facility] ON THE [facility]   Nursing Team Member [facility] Management Team Member Neetu   Patient/Family Present   Patient Present No   Patient's Family Present No     Pt guarded, scant in conversation. Pt took Risperdal with significant education and prompting. Pt visible on the unit. Discharge to be determined.

## 2023-12-07 NOTE — SOCIAL WORK
SW was advised [employer] are present to serve PFA to pt. SW and MHT accompanied pt to secure are for police to serve PFA. Pt remained appropriate during explanation and was accepting of terms outlined in PFA. Pt is to have no contact with his mother and is not allowed to return to her residence at [address] [place] [address] upon discharge. Pt's court hearing is scheduled for 12/12 but will be continued if pt remains inpatient at that time.

## 2023-12-07 NOTE — PROGRESS NOTES
12/06/23 1530   Team Meeting   Meeting Type Tx Team Meeting   Team Members Present   Team Members Present Physician;Nurse;   Physician Team Member [facility] ON THE [facility]   Nursing Team Member [facility] Management Team Member Neetu   Patient/Family Present   Patient Present Yes   Patient's Family Present No     Tx plan was reviewed and discussed with Pt. Pt was encouraged to attend groups. Medication was discussed with Pt. Pt signed tx plan.
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
## 2023-12-07 NOTE — NURSING NOTE
Patient appears to be guarded and short with answers. Patient denied SI/HI/AH/VH at this time. Compliant with medications , noted in and out of room for short periods of time. Currently in small tv room watching television. Offers no complaints of pain or discomfort at this time.

## 2023-12-07 NOTE — PLAN OF CARE
Problem: Ineffective Coping  Goal: Participates in unit activities  Description: Interventions:  - Provide therapeutic environment   - Provide required programming   - Redirect inappropriate behaviors   Outcome: Progressing     Problem: Risk for Violence/Aggression Toward Others  Goal: Refrain from harming others  Outcome: Progressing  Goal: Control angry outbursts  Description: Interventions:  - Monitor patient closely, per order  - Ensure early verbal de-escalation  - Monitor prn medication needs  - Set reasonable/therapeutic limits, outline behavioral expectations, and consequences   - Provide a non-threatening milieu, utilizing the least restrictive interventions   Outcome: Progressing     Problem: Alteration in Orientation  Goal: Allow medical examinations, as recommended  Description: Interventions:  - Provide physical/neurological exams and/or referrals, per provider   Outcome: Progressing  Goal: Cooperate with recommended testing/procedures  Description: Interventions:  - Determine need for ancillary testing  - Observe for mental status changes  - Implement falls/precaution protocol   Outcome: Progressing     Problem: Nutrition/Hydration-ADULT  Goal: Nutrient/Hydration intake appropriate for improving, restoring or maintaining nutritional needs  Description: Monitor and assess patient's nutrition/hydration status for malnutrition. Collaborate with interdisciplinary team and initiate plan and interventions as ordered. Monitor patient's weight and dietary intake as ordered or per policy. Utilize nutrition screening tool and intervene as necessary. Determine patient's food preferences and provide high-protein, high-caloric foods as appropriate.      INTERVENTIONS:  - Monitor oral intake, urinary output, labs, and treatment plans  - Assess nutrition and hydration status and recommend course of action  - Evaluate amount of meals eaten  - Assist patient with eating if necessary   - Allow adequate time for meals  - Recommend/ encourage appropriate diets, oral nutritional supplements, and vitamin/mineral supplements  - Order, calculate, and assess calorie counts as needed  - Recommend, monitor, and adjust tube feedings and TPN/PPN based on assessed needs  - Assess need for intravenous fluids  - Provide specific nutrition/hydration education as appropriate  - Include patient/family/caregiver in decisions related to nutrition  Outcome: Progressing

## 2023-12-08 PROCEDURE — 99232 SBSQ HOSP IP/OBS MODERATE 35: CPT | Performed by: STUDENT IN AN ORGANIZED HEALTH CARE EDUCATION/TRAINING PROGRAM

## 2023-12-08 RX ADMIN — NICOTINE 1 PATCH: 21 PATCH, EXTENDED RELEASE TRANSDERMAL at 08:13

## 2023-12-08 RX ADMIN — RISPERIDONE 1 MG: 1 TABLET ORAL at 21:20

## 2023-12-08 NOTE — NURSING NOTE
Pt cooperative, Pt pleasant upon approach, Pt visible on the unit seen on the phone and talking with peers, Pt denied all pain anxiety and depression, Pt denies HI and SI , Pt denied AH and VH, Pt took all scheduled HS meds, Q7 min safety checks maintained

## 2023-12-08 NOTE — PROGRESS NOTES
12/08/23 0843   Team Meeting   Meeting Type Daily Rounds   Team Members Present   Team Members Present Physician;Nurse;   Physician Team Member [facility] ON THE [facility]   Nursing Team Member [facility] Management Team Member Neetu   Patient/Family Present   Patient Present No   Patient's Family Present No     Pt was served PFA by [employer] yesterday. Pleasant and cooperative. Med/meal compliant. Visible on the unit, attending groups. Discharge to be determined.

## 2023-12-08 NOTE — PLAN OF CARE
Problem: Ineffective Coping  Goal: Participates in unit activities  Description: Interventions:  - Provide therapeutic environment   - Provide required programming   - Redirect inappropriate behaviors   Outcome: Progressing     Problem: Risk for Violence/Aggression Toward Others  Goal: Refrain from harming others  Outcome: Progressing  Goal: Control angry outbursts  Description: Interventions:  - Monitor patient closely, per order  - Ensure early verbal de-escalation  - Monitor prn medication needs  - Set reasonable/therapeutic limits, outline behavioral expectations, and consequences   - Provide a non-threatening milieu, utilizing the least restrictive interventions   Outcome: Progressing     Problem: Alteration in Orientation  Goal: Express concerns related to confused thinking related to:  Description: Interventions:  - Encourage patient to express feelings, fears, frustrations, hopes  - Assign consistent caregivers   - [place]/re-orient patient as needed  - Allow comfort items, as appropriate  - Provide visual cues, signs, etc.   Outcome: Progressing  Goal: Allow medical examinations, as recommended  Description: Interventions:  - Provide physical/neurological exams and/or referrals, per provider   Outcome: Progressing  Goal: Cooperate with recommended testing/procedures  Description: Interventions:  - Determine need for ancillary testing  - Observe for mental status changes  - Implement falls/precaution protocol   Outcome: Progressing     Problem: Nutrition/Hydration-ADULT  Goal: Nutrient/Hydration intake appropriate for improving, restoring or maintaining nutritional needs  Description: Monitor and assess patient's nutrition/hydration status for malnutrition. Collaborate with interdisciplinary team and initiate plan and interventions as ordered. Monitor patient's weight and dietary intake as ordered or per policy. Utilize nutrition screening tool and intervene as necessary.  Determine patient's food preferences and provide high-protein, high-caloric foods as appropriate.      INTERVENTIONS:  - Monitor oral intake, urinary output, labs, and treatment plans  - Assess nutrition and hydration status and recommend course of action  - Evaluate amount of meals eaten  - Assist patient with eating if necessary   - Allow adequate time for meals  - Recommend/ encourage appropriate diets, oral nutritional supplements, and vitamin/mineral supplements  - Order, calculate, and assess calorie counts as needed  - Recommend, monitor, and adjust tube feedings and TPN/PPN based on assessed needs  - Assess need for intravenous fluids  - Provide specific nutrition/hydration education as appropriate  - Include patient/family/caregiver in decisions related to nutrition  Outcome: Progressing     Problem: DISCHARGE PLANNING  Goal: Discharge to home or other facility with appropriate resources  Description: INTERVENTIONS:  - Identify barriers to discharge w/patient and caregiver  - Arrange for needed discharge resources and transportation as appropriate  - Identify discharge learning needs (meds, wound care, etc.)  - Arrange for interpretive services to assist at discharge as needed  - Refer to Case Management Department for coordinating discharge planning if the patient needs post-hospital services based on physician/advanced practitioner order or complex needs related to functional status, cognitive ability, or social support system  Outcome: Progressing

## 2023-12-08 NOTE — PLAN OF CARE
Problem: Ineffective Coping  Goal: Participates in unit activities  Description: Interventions:  - Provide therapeutic environment   - Provide required programming   - Redirect inappropriate behaviors   Outcome: Progressing   Patient is active in groups

## 2023-12-08 NOTE — NURSING NOTE
About the unit. Cooperative. Interacting with peers and attending groups. Denies symptoms, no complaints. Hopeful for discharge soon.
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
## 2023-12-09 PROCEDURE — 99232 SBSQ HOSP IP/OBS MODERATE 35: CPT | Performed by: STUDENT IN AN ORGANIZED HEALTH CARE EDUCATION/TRAINING PROGRAM

## 2023-12-09 RX ADMIN — NICOTINE 1 PATCH: 21 PATCH, EXTENDED RELEASE TRANSDERMAL at 08:57

## 2023-12-09 RX ADMIN — RISPERIDONE 1 MG: 1 TABLET ORAL at 21:08

## 2023-12-09 NOTE — PROGRESS NOTES
Progress Note - Behavioral Health   Agata Guo 25 y.o. male MRN: [id number]  Unit/Bed#: [facility] 341-02 Encounter: [id number]    Assessment/Plan   Principal Problem:    Psychosis ([address])  Active Problems:    Severe heroin use disorder in sustained remission ([address])    Severe cannabis use disorder ([address])    Medical clearance for psychiatric admission    Vaping nicotine dependence, tobacco product    Alcohol abuse    Severe protein-calorie malnutrition (HCC)    Vitamin D deficiency      Plan:  Continue risperidone 1 mg nightly for psychosis    Continue with group therapy, milieu therapy and occupational therapy. Continue frequent safety checks and vitals per unit protocol. Case discussed with treatment team.  Continue with [facility] medical management as indicated  Continue coordinating with case management regarding disposition  Risks, benefits and possible side effects of Medications: Risks, benefits, and possible side effects of medications have previously been explained. No new medications at this time. Legal Status: 303  ------------------------------------------------------------    Subjective: No concerns from nursing. Patient reports he remembers physician from previous evaluation. He reports sleep, appetite, energy are all normal.  He denies adverse effects of medication. PRNs overnight: none   VS: Reviewed, within normal limits    Progress Toward Goals: no improvement    Psychiatric Review of Systems:  Behavior over the last 24 hours: unchanged  Sleep: normal  Appetite: poor oral intake  Medication side effects: none verbalized  ROS: Complete review of systems is negative except as noted above.     Vital signs in last 24 hours:  Temp:  [96.3 °F (35.7 °C)-97.5 °F (36.4 °C)] 97.5 °F (36.4 °C)  HR:  [] 83  Resp:  [16] 16  BP: (108-139)/(58-68) 108/58    Mental Status Exam:  Appearance:  Overtly underweight, casual black attire and groomed facial hair, disheveled hair appears unwashed  Appropriate eye contact   Behavior:  Superficial, cooperative   Speech/language:  Paucity of speech, otherwise normal rate and volume   Mood:  "Good"   Affect:  Blunted   Thought Process:  Paucity of thought   Thought Content/perceptual experiences:  Paranoid ideation  Denies hallucinations  Denies SI and HI   Cognitive Examination: Level of Awareness or Wakefulness: Awake and alert  Attention and Concentration: Able to concentrate on questions and maintain train of thought  Memory: Oriented  Insight: poor  Judgment: poor     Current Medications:  Current Facility-Administered Medications   Medication Dose Route Frequency Provider Last Rate    acetaminophen  650 mg Oral Q6H PRN Rylie Eastman MD      acetaminophen  650 mg Oral Q4H PRN Rylie Eastman MD      acetaminophen  975 mg Oral Q6H PRN Rylie Eastman MD      aluminum-magnesium hydroxide-simethicone  30 mL Oral Q4H PRN Rylie Eastman MD      haloperidol lactate  2.5 mg Intramuscular Q4H PRN Max 4/day Rylie Eastman MD      And    LORazepam  1 mg Intramuscular Q4H PRN Max 4/day Rylie Eastman MD      And    benztropine  0.5 mg Intramuscular Q4H PRN Max 4/day Rylie Eastman MD      haloperidol lactate  5 mg Intramuscular Q4H PRN Max 4/day Rylie Eastman MD      And    LORazepam  2 mg Intramuscular Q4H PRN Max 4/day Rylie Eastman MD      And    benztropine  1 mg Intramuscular Q4H PRN Max 4/day Rylie Eastman MD      benztropine  1 mg Oral Q4H PRN Max 6/day Rylie Eastman MD      hydrOXYzine HCL  50 mg Oral Q6H PRN Max 4/day Rylie Eastman MD      Or    diphenhydrAMINE  50 mg Intramuscular Q6H PRN Rylie Eastman MD      ergocalciferol  50,000 Units Oral Weekly CINDY Lazcano      glycerin-hypromellose-  1 drop Both Eyes Q3H PRN Rylie Eastman MD      hydrOXYzine HCL  100 mg Oral Q6H PRN Max 4/day Rylie Eastman MD      Or    LORazepam  2 mg Intramuscular Q6H PRN Rylie Eastman MD      hydrOXYzine HCL  25 mg Oral Q6H PRN Max 4/day Rylie Eastman MD      melatonin  3 mg Oral HS BRYCE Aquino MD      nicotine  1 patch Transdermal Daily [place] MD      nicotine polacrilex  2 mg Oral Q2H PRGIANFRANCO Aquino MD      polyethylene glycol  17 g Oral Daily PRN Karyn Aquino MD      propranolol  10 mg Oral Q8H PRN Karyn Aquino MD      risperiDONE  0.25 mg Oral Q4H PRN Max 6/day Karyn Aquino MD      risperiDONE  0.5 mg Oral Q4H PRN Max 3/day Karyn Aquino MD      risperiDONE  1 mg Oral Q4H PRN Max 6/day Karyn Aquino MD      risperiDONE  1 mg Oral HS [place] MD      senna-docusate sodium  1 tablet Oral Daily PRGIANFRANCO Aquino MD         Behavioral Health Medications: all current active meds have been reviewed. Changes as in plan section above. Laboratory results:  I have personally reviewed all pertinent laboratory/tests results. No results found for this or any previous visit (from the past 48 hour(s)). This note has been constructed using a voice recognition system. There may be translation, syntax, or grammatical errors. If you have any questions, please contact the dictating author.     Neris Gomes MD  Resident PGY II  Psychiatry

## 2023-12-09 NOTE — PLAN OF CARE
Problem: Anxiety  Goal: Anxiety is at manageable level  Description: Interventions:  - Assess and monitor patient's anxiety level. - Monitor for signs and symptoms (heart palpitations, chest pain, shortness of breath, headaches, nausea, feeling jumpy, restlessness, irritable, apprehensive). - Collaborate with interdisciplinary team and initiate plan and interventions as ordered.   - [place] patient to unit/surroundings  - Explain treatment plan  - Encourage participation in care  - Encourage verbalization of concerns/fears  - Identify coping mechanisms  - Assist in developing anxiety-reducing skills  - Administer/offer alternative therapies  - Limit or eliminate stimulants  Outcome: Progressing     Problem: Risk for Violence/Aggression Toward Others  Goal: Refrain from harming others  Outcome: Progressing  Goal: Control angry outbursts  Description: Interventions:  - Monitor patient closely, per order  - Ensure early verbal de-escalation  - Monitor prn medication needs  - Set reasonable/therapeutic limits, outline behavioral expectations, and consequences   - Provide a non-threatening milieu, utilizing the least restrictive interventions   Outcome: Progressing     Problem: Alteration in Orientation  Goal: Express concerns related to confused thinking related to:  Description: Interventions:  - Encourage patient to express feelings, fears, frustrations, hopes  - Assign consistent caregivers   - [place]/re-orient patient as needed  - Allow comfort items, as appropriate  - Provide visual cues, signs, etc.   Outcome: Progressing  Goal: Allow medical examinations, as recommended  Description: Interventions:  - Provide physical/neurological exams and/or referrals, per provider   Outcome: Progressing  Goal: Cooperate with recommended testing/procedures  Description: Interventions:  - Determine need for ancillary testing  - Observe for mental status changes  - Implement falls/precaution protocol   Outcome: Progressing Problem: Nutrition/Hydration-ADULT  Goal: Nutrient/Hydration intake appropriate for improving, restoring or maintaining nutritional needs  Description: Monitor and assess patient's nutrition/hydration status for malnutrition. Collaborate with interdisciplinary team and initiate plan and interventions as ordered. Monitor patient's weight and dietary intake as ordered or per policy. Utilize nutrition screening tool and intervene as necessary. Determine patient's food preferences and provide high-protein, high-caloric foods as appropriate.      INTERVENTIONS:  - Monitor oral intake, urinary output, labs, and treatment plans  - Assess nutrition and hydration status and recommend course of action  - Evaluate amount of meals eaten  - Assist patient with eating if necessary   - Allow adequate time for meals  - Recommend/ encourage appropriate diets, oral nutritional supplements, and vitamin/mineral supplements  - Order, calculate, and assess calorie counts as needed  - Recommend, monitor, and adjust tube feedings and TPN/PPN based on assessed needs  - Assess need for intravenous fluids  - Provide specific nutrition/hydration education as appropriate  - Include patient/family/caregiver in decisions related to nutrition  Outcome: Progressing

## 2023-12-09 NOTE — NURSING NOTE
Patient is pleasant, and cooperative. He does not appear to be responding to internal stimuli, or paranoid. He is compliant with meds/meals. He denies AVH/SI/HI. He is in behavioral control, social, and visible.

## 2023-12-09 NOTE — NURSING NOTE
Patient noted in and out of room intermittently, enjoys watching tv and attends group. Patient appears guarded upon approach,and short with answers. Compliant with medications and routine care. Denies SI/HI/AH/VH at this time. Currently watching tv in dayroom.

## 2023-12-10 PROCEDURE — 99232 SBSQ HOSP IP/OBS MODERATE 35: CPT | Performed by: STUDENT IN AN ORGANIZED HEALTH CARE EDUCATION/TRAINING PROGRAM

## 2023-12-10 RX ADMIN — NICOTINE 1 PATCH: 21 PATCH, EXTENDED RELEASE TRANSDERMAL at 08:17

## 2023-12-10 RX ADMIN — RISPERIDONE 1 MG: 1 TABLET ORAL at 21:32

## 2023-12-10 NOTE — PLAN OF CARE
Problem: Ineffective Coping  Goal: Participates in unit activities  Description: Interventions:  - Provide therapeutic environment   - Provide required programming   - Redirect inappropriate behaviors   Outcome: Progressing     Problem: Anxiety  Goal: Anxiety is at manageable level  Description: Interventions:  - Assess and monitor patient's anxiety level. - Monitor for signs and symptoms (heart palpitations, chest pain, shortness of breath, headaches, nausea, feeling jumpy, restlessness, irritable, apprehensive). - Collaborate with interdisciplinary team and initiate plan and interventions as ordered.   - [place] patient to unit/surroundings  - Explain treatment plan  - Encourage participation in care  - Encourage verbalization of concerns/fears  - Identify coping mechanisms  - Assist in developing anxiety-reducing skills  - Administer/offer alternative therapies  - Limit or eliminate stimulants  Outcome: Progressing     Problem: Risk for Violence/Aggression Toward Others  Goal: Refrain from harming others  Outcome: Progressing  Goal: Control angry outbursts  Description: Interventions:  - Monitor patient closely, per order  - Ensure early verbal de-escalation  - Monitor prn medication needs  - Set reasonable/therapeutic limits, outline behavioral expectations, and consequences   - Provide a non-threatening milieu, utilizing the least restrictive interventions   Outcome: Progressing     Problem: Alteration in Orientation  Goal: Express concerns related to confused thinking related to:  Description: Interventions:  - Encourage patient to express feelings, fears, frustrations, hopes  - Assign consistent caregivers   - [place]/re-orient patient as needed  - Allow comfort items, as appropriate  - Provide visual cues, signs, etc.   Outcome: Progressing  Goal: Allow medical examinations, as recommended  Description: Interventions:  - Provide physical/neurological exams and/or referrals, per provider   Outcome: Progressing  Goal: Cooperate with recommended testing/procedures  Description: Interventions:  - Determine need for ancillary testing  - Observe for mental status changes  - Implement falls/precaution protocol   Outcome: Progressing     Problem: Nutrition/Hydration-ADULT  Goal: Nutrient/Hydration intake appropriate for improving, restoring or maintaining nutritional needs  Description: Monitor and assess patient's nutrition/hydration status for malnutrition. Collaborate with interdisciplinary team and initiate plan and interventions as ordered. Monitor patient's weight and dietary intake as ordered or per policy. Utilize nutrition screening tool and intervene as necessary. Determine patient's food preferences and provide high-protein, high-caloric foods as appropriate.      INTERVENTIONS:  - Monitor oral intake, urinary output, labs, and treatment plans  - Assess nutrition and hydration status and recommend course of action  - Evaluate amount of meals eaten  - Assist patient with eating if necessary   - Allow adequate time for meals  - Recommend/ encourage appropriate diets, oral nutritional supplements, and vitamin/mineral supplements  - Order, calculate, and assess calorie counts as needed  - Recommend, monitor, and adjust tube feedings and TPN/PPN based on assessed needs  - Assess need for intravenous fluids  - Provide specific nutrition/hydration education as appropriate  - Include patient/family/caregiver in decisions related to nutrition  Outcome: Progressing     Problem: DISCHARGE PLANNING  Goal: Discharge to home or other facility with appropriate resources  Description: INTERVENTIONS:  - Identify barriers to discharge w/patient and caregiver  - Arrange for needed discharge resources and transportation as appropriate  - Identify discharge learning needs (meds, wound care, etc.)  - Arrange for interpretive services to assist at discharge as needed  - Refer to Case Management Department for coordinating discharge planning if the patient needs post-hospital services based on physician/advanced practitioner order or complex needs related to functional status, cognitive ability, or social support system  Outcome: Progressing

## 2023-12-10 NOTE — PROGRESS NOTES
Progress Note - Behavioral Health   Agaat Guo 25 y.o. male MRN: [id number]  Unit/Bed#: [facility] 347-01 Encounter: [id number]    Assessment/Plan   Principal Problem:    Psychosis ([address] [facility])  Active Problems:    Severe heroin use disorder in sustained remission (HCC)    Severe cannabis use disorder ([address] [facility])    Medical clearance for psychiatric admission    Vaping nicotine dependence, tobacco product    Alcohol abuse    Severe protein-calorie malnutrition (HCC)    Vitamin D deficiency      Plan:  Continue risperidone 1 mg nightly for psychosis     Continue with group therapy, milieu therapy and occupational therapy. Continue frequent safety checks and vitals per unit protocol. Case discussed with treatment team.  Continue with [facility] medical management as indicated  Continue coordinating with case management regarding disposition  Risks, benefits and possible side effects of Medications: Risks, benefits, and possible side effects of medications have previously been explained. No new medications at this time. Legal Status: 303  ------------------------------------------------------------    Subjective: No complaints from nursing. Today he reports sleeping normally. He is eating normally here and happy about additional portions and reports having gained 5 pounds in hospital. Prior to admission he reports not eating due to being unable to afford food and not wanting to touch his family's food due to them being "not supportive generally." He says he is "not paranoid" however the medication has made his thoughts clearer and made him calmer; although they will not help his "real life issues."     PRNs overnight: none   VS: Reviewed, within normal limits    Progress Toward Goals: minimal improvement    Psychiatric Review of Systems:  Behavior over the last 24 hours: unchanged  Sleep: normal  Appetite: improving  Medication side effects: none verbalized  ROS: Complete review of systems is negative except as noted above.     Vital signs in last 24 hours:  Temp:  [97.9 °F (36.6 °C)-98.3 °F (36.8 °C)] 97.9 °F (36.6 °C)  HR:  [101] 101  Resp:  [16] 16  BP: (109-117)/(64-72) 109/64    Mental Status Exam:  Appearance:  Gaunt, ungroomed facial hair, short dark curly hair disheveled, casual attire in black  Intermittent eye contact   Behavior:  cooperative   Speech/language:  Normal rate and volume   Mood:  "A little cranky"   Affect:  Less constricted, more appropriate from previous   Thought Process:   May be internally preoccupied  Otherwise logical   Thought Content/perceptual experiences:  Paranoid ideation  Denies hallucinations  Denies SI and HI   Cognitive Examination: Level of Awareness or Wakefulness: Awake and alert  Attention and Concentration: Able to concentrate on questions and maintain train of thought  Memory: Oriented  Insight: poor  Judgment: limited     Current Medications:  Current Facility-Administered Medications   Medication Dose Route Frequency Provider Last Rate    acetaminophen  650 mg Oral Q6H PRN Karyn Aquino MD      acetaminophen  650 mg Oral Q4H PRN Karyn Aquino MD      acetaminophen  975 mg Oral Q6H PRN Karyn Aquino MD      aluminum-magnesium hydroxide-simethicone  30 mL Oral Q4H PRN Karyn Aquino MD      haloperidol lactate  2.5 mg Intramuscular Q4H PRN Max 4/day Karyn Aquino MD      And    LORazepam  1 mg Intramuscular Q4H PRN Max 4/day Karyn Aquino MD      And    benztropine  0.5 mg Intramuscular Q4H PRN Max 4/day Karyn Aquino MD      haloperidol lactate  5 mg Intramuscular Q4H PRN Max 4/day Karyn Aquino MD      And    LORazepam  2 mg Intramuscular Q4H PRN Max 4/day Karyn Aquino MD      And    benztropine  1 mg Intramuscular Q4H PRN Max 4/day Karyn Aquino MD      benztropine  1 mg Oral Q4H PRN Max 6/day Karyn Aquino MD      hydrOXYzine HCL  50 mg Oral Q6H PRN Max 4/day aKryn Aquino MD      Or    diphenhydrAMINE  50 mg Intramuscular Q6H PRN Karyn Aquino MD      ergocalciferol  50,000 Units Oral Weekly CINDY Lazcano      glycerin-hypromellose-  1 drop Both Eyes Q3H PRN Edson Guzman MD      hydrOXYzine HCL  100 mg Oral Q6H PRN Max 4/day Edson Guzman MD      Or    LORazepam  2 mg Intramuscular Q6H PRN Edson Guzman MD      hydrOXYzine HCL  25 mg Oral Q6H PRN Max 4/day Edson Guzman MD      melatonin  3 mg Oral HS PRN Edson Guzman MD      nicotine  1 patch Transdermal Daily Modesta Stovall MD      nicotine polacrilex  2 mg Oral Q2H PRN Edson Guzman MD      polyethylene glycol  17 g Oral Daily PRN Edson Guzman MD      propranolol  10 mg Oral Q8H PRN Edson Guzman MD      risperiDONE  0.25 mg Oral Q4H PRN Max 6/day Edson Guzman MD      risperiDONE  0.5 mg Oral Q4H PRN Max 3/day Edson Guzman MD      risperiDONE  1 mg Oral Q4H PRN Max 6/day Edson Guzman MD      risperiDONE  1 mg Oral HS Modesta Stovall MD      senna-docusate sodium  1 tablet Oral Daily PRN Edson Guzman MD         Behavioral Health Medications: all current active meds have been reviewed. Changes as in plan section above. Laboratory results:  I have personally reviewed all pertinent laboratory/tests results. No results found for this or any previous visit (from the past 48 hour(s)). This note has been constructed using a voice recognition system. There may be translation, syntax, or grammatical errors. If you have any questions, please contact the dictating author.     Rodrigo Oliveira MD  Resident PGY II  Psychiatry

## 2023-12-10 NOTE — NURSING NOTE
Patient out of room on unit, noted walking and watching tv. Patient appears to be calm and in good spirits. Compliant with medications and routine care. Denies SI/HI/AH/VH, offers no complaints of pain or discomfort.

## 2023-12-10 NOTE — NURSING NOTE
Pt is visible on unit and in dayroom. He is social with peers but quiet at times. He is cooperative with staff. Pt is wearing his clothing from home. Pt denies SI/HI/AH/VH, and pain. Medication and meal compliant. No needs expressed this shift.

## 2023-12-11 PROCEDURE — 99232 SBSQ HOSP IP/OBS MODERATE 35: CPT | Performed by: STUDENT IN AN ORGANIZED HEALTH CARE EDUCATION/TRAINING PROGRAM

## 2023-12-11 RX ADMIN — RISPERIDONE 1 MG: 1 TABLET ORAL at 21:11

## 2023-12-11 RX ADMIN — NICOTINE 1 PATCH: 21 PATCH, EXTENDED RELEASE TRANSDERMAL at 08:07

## 2023-12-11 RX ADMIN — NICOTINE POLACRILEX 2 MG: 2 GUM, CHEWING BUCCAL at 22:33

## 2023-12-11 NOTE — SOCIAL WORK
SW received return phone call from pt's father. Father stated he and pt lost touch for a few years but reconnected several years ago. Dad has never seen pt display any paranoia or signs of mental illness. Father reports pt has been able to hold down a decent job and was making good money for himself. Father states when pt starts doing well for himself, pt and his mother usually get into arguments and pt's mother kicks him out of the home. Father reports he believes these are attempts to sabotage pt. Father states he does not believe pt's mother has ever been formally diagnosed with any mental illness but frequently engages in risky behavior such as prostitution, substance abuse and sleeping with Pt's friends. Father reports Pt's mother may have some trauma as pt was born on mother's 16th birthday.

## 2023-12-11 NOTE — NURSING NOTE
Pt is calm and cooperative upon approach. Pt is visible on the milieu and social with select peers. Pt is medication and meal compliant. Pt denies SI, HI, AH, and VH. Denies any needs at this time.

## 2023-12-11 NOTE — NURSING NOTE
Pt is quiet, calm, and cooperative. Pt is med complaint and can be seen in dayroom socializing. Pt denies depression and anxiety. Pt denies SI/HI and AH/VH. No unmet needs reported. Will continue to monitor.

## 2023-12-11 NOTE — PLAN OF CARE
SW called pt's father, Jewel Medrano ([phone]) and left voicemail requesting return phone call. SW will continue to reach out to discuss discharge planning and pt progress.

## 2023-12-11 NOTE — PROGRESS NOTES
Progress Note - [address] 25 y.o. male MRN: [id number]  Unit/Bed#: [facility] 347-01 Encounter: [id number]    Subjective:   Per nursing report, patient is doing better on the unit. He has been calm and appropriate. He participates in groups and has been medication compliant. He has been without acute behavioral concerns. Patient is feeling well today. He is pleasant on approach and reports tolerating medications well. He notes that he does feel that his thoughts are more clear but that he does like his "manic brain" as he feels that it has gotten him good opportunities in life. He denies any SI, HI, or AVH. He denies any questions or concerns at this time. Behavior over the last 24 hours:  improved  Sleep: normal  Appetite: normal  Medication side effects: No  ROS: no complaints and all other systems are negative    Mental Status Evaluation:  Appearance:  dressed appropriately and adequately groomed   Behavior:  calm, pleasant, and cooperative   Speech:  normal rate and volume   Mood:  euthymic   Affect:  normal range and intensity, appropriate   Thought Process:  linear and goal directed   Associations: intact associations   Thought Content:  no overt delusions   Perceptual Disturbances: denies auditory or visual hallucinations when asked, does not appear responding to internal stimuli   Risk Potential: Suicidal ideation - None  Homicidal ideation - None  Potential for aggression - Not at present   Sensorium:  oriented to person, place, and time/date   Memory:  recent and remote memory grossly intact   Consciousness:  alert and awake   Attention/Concentration: attention span and concentration are age appropriate   Insight:  limited   Judgment: limited   Gait/Station: normal gait/station, normal balance   Motor Activity: no abnormal movements     Medications: all current active meds have been reviewed.   Current Facility-Administered Medications   Medication Dose Route Frequency Provider Last Rate acetaminophen  650 mg Oral Q6H PRN Sangeetha Palomino MD      acetaminophen  650 mg Oral Q4H PRN Sangeetha Palomino MD      acetaminophen  975 mg Oral Q6H PRN Sangeetha Palomino MD      aluminum-magnesium hydroxide-simethicone  30 mL Oral Q4H PRN Sangeetha Palomino MD      haloperidol lactate  2.5 mg Intramuscular Q4H PRN Max 4/day Sangeetha Palomino MD      And    LORazepam  1 mg Intramuscular Q4H PRN Max 4/day Sangeetha Palomino MD      And    benztropine  0.5 mg Intramuscular Q4H PRN Max 4/day Sangeetha Palomino MD      haloperidol lactate  5 mg Intramuscular Q4H PRN Max 4/day Sangeetha Palomino MD      And    LORazepam  2 mg Intramuscular Q4H PRN Max 4/day Sangeetha Palomino MD      And    benztropine  1 mg Intramuscular Q4H PRN Max 4/day Sangeetha Palomino MD      benztropine  1 mg Oral Q4H PRN Max 6/day Sangeetha Palomino MD      hydrOXYzine HCL  50 mg Oral Q6H PRN Max 4/day Sangeetha Palomino MD      Or    diphenhydrAMINE  50 mg Intramuscular Q6H PRN Sangeetha Palomino MD      ergocalciferol  50,000 Units Oral Weekly CINDY Lazcano      glycerin-hypromellose-  1 drop Both Eyes Q3H PRN Sangeetha Palomino MD      hydrOXYzine HCL  100 mg Oral Q6H PRN Max 4/day Sangeetha Palomino MD      Or    LORazepam  2 mg Intramuscular Q6H PRN Sangeetha Palomino MD      hydrOXYzine HCL  25 mg Oral Q6H PRN Max 4/day Sangeetha Palomino MD      melatonin  3 mg Oral HS PRN Sangeetha Palomino MD      nicotine  1 patch Transdermal Daily Donovan Mcdowell MD      nicotine polacrilex  2 mg Oral Q2H PRN Sangeetha Palomino MD      polyethylene glycol  17 g Oral Daily PRN Sangeetha Palomino MD      propranolol  10 mg Oral Q8H PRN Sangeetha Palomino MD      risperiDONE  0.25 mg Oral Q4H PRN Max 6/day Sangeetha Palomino MD      risperiDONE  0.5 mg Oral Q4H PRN Max 3/day Sangeetha Palomino MD      risperiDONE  1 mg Oral Q4H PRN Max 6/day Sangeetha Palomino MD      risperiDONE  1 mg Oral HS Donovan Mcdowell MD      senna-docusate sodium  1 tablet Oral Daily PRN Sangeetha Palomino MD         Labs:  I have personally reviewed all pertinent laboratory/tests results  Most Recent Labs:   Lab Results   Component Value Date    WBC 6.71 12/05/2023    RBC 4.73 12/05/2023    HGB 14.0 12/05/2023    HCT 43.5 12/05/2023     12/05/2023    RDW 12.1 12/05/2023    NEUTROABS 2.80 12/05/2023    SODIUM 141 12/05/2023    K 3.8 12/05/2023     12/05/2023    CO2 29 12/05/2023    BUN 10 12/05/2023    CREATININE 1.00 12/05/2023    GLUC 91 12/05/2023    CALCIUM 9.7 12/05/2023    AST 17 12/05/2023    ALT 11 12/05/2023    ALKPHOS 42 12/05/2023    TP 7.1 12/05/2023    ALB 4.9 12/05/2023    TBILI 0.67 12/05/2023    CHOLESTEROL 139 12/05/2023    HDL 60 12/05/2023    TRIG 46 12/05/2023    LDLCALC 70 12/05/2023    NONHDLC 79 12/05/2023    CWU7ECMSFRIE 1.492 12/05/2023    SYPHILISAB Non-reactive 12/05/2023         Assessment/Plan   Principal Problem:    Psychosis ([address] [facility])  Active Problems:    Severe heroin use disorder in sustained remission (HCC)    Severe cannabis use disorder ([address] [facility])    Medical clearance for psychiatric admission    Vaping nicotine dependence, tobacco product    Alcohol abuse    Severe protein-calorie malnutrition (HCC)    Vitamin D deficiency    Recommended Treatment:   Continue risperidone 1 mg QHS. Continue all other medications at current doses as above. Encourage group therapy, milieu therapy and occupational therapy  All current active medications have been reviewed  Encourage group therapy, milieu therapy and occupational therapy  Behavioral Health checks every 7 minutes  On 303 commitment    ----------------------------------------    Progress Toward Goals: progressing    Risks / Benefits of Treatment:    Risks, benefits, and possible side effects of medications explained to patient and patient verbalizes understanding and agreement for treatment. Counseling / Coordination of Care:    Patient's progress discussed with staff in treatment team meeting. Medications, treatment progress and treatment plan reviewed with patient.     Mary Beth Haddad Tracy Wilkerson MD 12/11/23

## 2023-12-11 NOTE — PLAN OF CARE
Problem: Ineffective Coping  Goal: Participates in unit activities  Description: Interventions:  - Provide therapeutic environment   - Provide required programming   - Redirect inappropriate behaviors   Outcome: Progressing     Problem: Anxiety  Goal: Anxiety is at manageable level  Description: Interventions:  - Assess and monitor patient's anxiety level. - Monitor for signs and symptoms (heart palpitations, chest pain, shortness of breath, headaches, nausea, feeling jumpy, restlessness, irritable, apprehensive). - Collaborate with interdisciplinary team and initiate plan and interventions as ordered.   - [place] patient to unit/surroundings  - Explain treatment plan  - Encourage participation in care  - Encourage verbalization of concerns/fears  - Identify coping mechanisms  - Assist in developing anxiety-reducing skills  - Administer/offer alternative therapies  - Limit or eliminate stimulants  Outcome: Progressing     Problem: Risk for Violence/Aggression Toward Others  Goal: Refrain from harming others  Outcome: Progressing  Goal: Control angry outbursts  Description: Interventions:  - Monitor patient closely, per order  - Ensure early verbal de-escalation  - Monitor prn medication needs  - Set reasonable/therapeutic limits, outline behavioral expectations, and consequences   - Provide a non-threatening milieu, utilizing the least restrictive interventions   Outcome: Progressing     Problem: Alteration in Orientation  Goal: Express concerns related to confused thinking related to:  Description: Interventions:  - Encourage patient to express feelings, fears, frustrations, hopes  - Assign consistent caregivers   - [place]/re-orient patient as needed  - Allow comfort items, as appropriate  - Provide visual cues, signs, etc.   Outcome: Progressing  Goal: Allow medical examinations, as recommended  Description: Interventions:  - Provide physical/neurological exams and/or referrals, per provider   Outcome: Progressing  Goal: Cooperate with recommended testing/procedures  Description: Interventions:  - Determine need for ancillary testing  - Observe for mental status changes  - Implement falls/precaution protocol   Outcome: Progressing     Problem: Nutrition/Hydration-ADULT  Goal: Nutrient/Hydration intake appropriate for improving, restoring or maintaining nutritional needs  Description: Monitor and assess patient's nutrition/hydration status for malnutrition. Collaborate with interdisciplinary team and initiate plan and interventions as ordered. Monitor patient's weight and dietary intake as ordered or per policy. Utilize nutrition screening tool and intervene as necessary. Determine patient's food preferences and provide high-protein, high-caloric foods as appropriate.      INTERVENTIONS:  - Monitor oral intake, urinary output, labs, and treatment plans  - Assess nutrition and hydration status and recommend course of action  - Evaluate amount of meals eaten  - Assist patient with eating if necessary   - Allow adequate time for meals  - Recommend/ encourage appropriate diets, oral nutritional supplements, and vitamin/mineral supplements  - Order, calculate, and assess calorie counts as needed  - Recommend, monitor, and adjust tube feedings and TPN/PPN based on assessed needs  - Assess need for intravenous fluids  - Provide specific nutrition/hydration education as appropriate  - Include patient/family/caregiver in decisions related to nutrition  Outcome: Progressing     Problem: DISCHARGE PLANNING  Goal: Discharge to home or other facility with appropriate resources  Description: INTERVENTIONS:  - Identify barriers to discharge w/patient and caregiver  - Arrange for needed discharge resources and transportation as appropriate  - Identify discharge learning needs (meds, wound care, etc.)  - Arrange for interpretive services to assist at discharge as needed  - Refer to Case Management Department for coordinating discharge planning if the patient needs post-hospital services based on physician/advanced practitioner order or complex needs related to functional status, cognitive ability, or social support system  Outcome: Progressing

## 2023-12-11 NOTE — PROGRESS NOTES
12/11/23 0845   Team Meeting   Meeting Type Daily Rounds   Team Members Present   Team Members Present Physician;Nurse;   Physician Team Member [facility] ON THE [facility]   Nursing Team Member [facility] Management Team Member [place]   Patient/Family Present   Patient Present No   Patient's Family Present No     Pt pleasant, cooperative. Med/meal compliant. Denies symptoms. Social with peers. Discharge possible Thursday.

## 2023-12-12 PROCEDURE — 99232 SBSQ HOSP IP/OBS MODERATE 35: CPT | Performed by: STUDENT IN AN ORGANIZED HEALTH CARE EDUCATION/TRAINING PROGRAM

## 2023-12-12 RX ADMIN — NICOTINE POLACRILEX 2 MG: 2 GUM, CHEWING BUCCAL at 21:37

## 2023-12-12 RX ADMIN — ACETAMINOPHEN 975 MG: 325 TABLET ORAL at 11:23

## 2023-12-12 RX ADMIN — NICOTINE POLACRILEX 2 MG: 2 GUM, CHEWING BUCCAL at 08:41

## 2023-12-12 RX ADMIN — NICOTINE 1 PATCH: 21 PATCH, EXTENDED RELEASE TRANSDERMAL at 08:18

## 2023-12-12 RX ADMIN — RISPERIDONE 1 MG: 1 TABLET ORAL at 21:35

## 2023-12-12 RX ADMIN — ERGOCALCIFEROL 50000 UNITS: 1.25 CAPSULE ORAL at 08:19

## 2023-12-12 RX ADMIN — MELATONIN TAB 3 MG 3 MG: 3 TAB at 21:37

## 2023-12-12 RX ADMIN — NICOTINE POLACRILEX 2 MG: 2 GUM, CHEWING BUCCAL at 19:40

## 2023-12-12 NOTE — PROGRESS NOTES
12/12/23 0920   Team Meeting   Meeting Type Daily Rounds   Team Members Present   Team Members Present Physician;Nurse;   Physician Team Member [facility] ON THE [facility]   Nursing Team Member [facility] Management Team Member [place]   Patient/Family Present   Patient Present No   Patient's Family Present No     Pt med/meal compliant. Denies all symptoms. Med/meal compliant. Discharge pending for Thursday.

## 2023-12-12 NOTE — NURSING NOTE
Pt verbally denies SI, HI and A/V hallucinations, compliant with meds, calm, cooperative and polite on approach, wearing personal attire by choice, positive concentration, judgement and reality based perceptions.

## 2023-12-12 NOTE — PLAN OF CARE
Problem: Ineffective Coping  Goal: Participates in unit activities  Description: Interventions:  - Provide therapeutic environment   - Provide required programming   - Redirect inappropriate behaviors   Outcome: Progressing     Problem: Anxiety  Goal: Anxiety is at manageable level  Description: Interventions:  - Assess and monitor patient's anxiety level. - Monitor for signs and symptoms (heart palpitations, chest pain, shortness of breath, headaches, nausea, feeling jumpy, restlessness, irritable, apprehensive). - Collaborate with interdisciplinary team and initiate plan and interventions as ordered.   - Chisholm patient to unit/surroundings  - Explain treatment plan  - Encourage participation in care  - Encourage verbalization of concerns/fears  - Identify coping mechanisms  - Assist in developing anxiety-reducing skills  - Administer/offer alternative therapies  - Limit or eliminate stimulants  Outcome: Progressing     Problem: Risk for Violence/Aggression Toward Others  Goal: Refrain from harming others  Outcome: Progressing  Goal: Control angry outbursts  Description: Interventions:  - Monitor patient closely, per order  - Ensure early verbal de-escalation  - Monitor prn medication needs  - Set reasonable/therapeutic limits, outline behavioral expectations, and consequences   - Provide a non-threatening milieu, utilizing the least restrictive interventions   Outcome: Progressing     Problem: Alteration in Orientation  Goal: Express concerns related to confused thinking related to:  Description: Interventions:  - Encourage patient to express feelings, fears, frustrations, hopes  - Assign consistent caregivers   - Chisholm/re-orient patient as needed  - Allow comfort items, as appropriate  - Provide visual cues, signs, etc.   Outcome: Progressing  Goal: Allow medical examinations, as recommended  Description: Interventions:  - Provide physical/neurological exams and/or referrals, per provider   Outcome: Progressing  Goal: Cooperate with recommended testing/procedures  Description: Interventions:  - Determine need for ancillary testing  - Observe for mental status changes  - Implement falls/precaution protocol   Outcome: Progressing     Problem: Nutrition/Hydration-ADULT  Goal: Nutrient/Hydration intake appropriate for improving, restoring or maintaining nutritional needs  Description: Monitor and assess patient's nutrition/hydration status for malnutrition. Collaborate with interdisciplinary team and initiate plan and interventions as ordered. Monitor patient's weight and dietary intake as ordered or per policy. Utilize nutrition screening tool and intervene as necessary. Determine patient's food preferences and provide high-protein, high-caloric foods as appropriate.      INTERVENTIONS:  - Monitor oral intake, urinary output, labs, and treatment plans  - Assess nutrition and hydration status and recommend course of action  - Evaluate amount of meals eaten  - Assist patient with eating if necessary   - Allow adequate time for meals  - Recommend/ encourage appropriate diets, oral nutritional supplements, and vitamin/mineral supplements  - Order, calculate, and assess calorie counts as needed  - Recommend, monitor, and adjust tube feedings and TPN/PPN based on assessed needs  - Assess need for intravenous fluids  - Provide specific nutrition/hydration education as appropriate  - Include patient/family/caregiver in decisions related to nutrition  Outcome: Progressing

## 2023-12-12 NOTE — NURSING NOTE
Pt visible in small TV room with peers watching TV throughout the evening. Pt appears anxious in affect but denies PRN or intervention at this time. Pt adherent with scheduled qhs medications, asked nurse to clarify that Risperdal "is not an SSRI, right?" Pt reassured and educated on Risperdal. Pt agreeable. Pt voices to nurse about discharge plans.  Pt denies SI/HI/AH/VH and current needs

## 2023-12-12 NOTE — PROGRESS NOTES
Progress Note - Behavioral Health   Zac Parikh 25 y.o. male MRN: [id number]  Unit/Bed#: [facility] 347-01 Encounter: [id number]    Patient was seen today for continuation of care, records reviewed and patient was discussed with the morning case review team.  Per staff, patient has been calm and cooperative on the unit. He is compliant with medications. Sterling York was seen today for psychiatric follow-up. On assessment today, Sterling York was seen in the hallway away from peers. Sterling York is calm, pleasant and cooperative with the interview. Continues to adamantly deny and paranoid thoughts or behaviors. Maintains that mother tries to illegally evict him from their home. Appears per CM notes, conversation with patient father does confirm mother may be dealing with her own mental health issues currently. [place] does continue to report he likes his "manic thoughts" on further questioning, he explains he likes his creativity and denies most manic symptoms. He is future thinking and goal oriented. He notes that his unemployment has come through and he may stay in a hotel for a few days. He will also get resources for homeless shelters in case he needs it. He also reports having some family out of state he may get in touch with and visit. He does have plans to look for work again. Reports sobriety from substances for 5 years and has good sober supports. He reports that he is sleeping well but has vivid dreams, he was advised to remove nicotine patch prior to HS. Sterling York denies acute suicidal/self-harm ideation/intent/plan upon direct inquiry at this time. Sterling York remains behaviorally appropriate, no agitation or aggression noted on exam or in report. Sterling York also denies HI/AH/VH, and does not appear overtly manic nor does he present as internally preoccupied. Tolerating Risperdal well. No overt delusions or paranoia are verbalized.   Sterling York remains adherent to his current psychotropic medication regimen and denies any side effects from medications, as well as none noted on exam.      Recommended Treatment: Treatment plan and medication changes discussed and per the attending physician the plan is: 1. Continue with group therapy, milieu therapy and occupational therapy  2. Behavioral Health checks every 7 minutes  3. Continue frequent safety checks and vitals per unit protocol  4. Continue with SLIM medical management as indicated  5. Continue with current medication regimen 1mg Risperdal PO daily at HS  6. Will review labs in the a.m. 7.Disposition Planning: Discharge planning and efforts remain ongoing, potential DC on Thursday with OP follow up    Vitals:  Vitals:    12/12/23 0716   BP: 166/59   Pulse: 91   Resp: 15   Temp: 97.9 °F (36.6 °C)   SpO2: 98%       Laboratory Results:  I have personally reviewed all pertinent laboratory/tests results. Most Recent Labs:   Lab Results   Component Value Date    WBC 6.71 12/05/2023    RBC 4.73 12/05/2023    HGB 14.0 12/05/2023    HCT 43.5 12/05/2023     12/05/2023    RDW 12.1 12/05/2023    NEUTROABS 2.80 12/05/2023    SODIUM 141 12/05/2023    K 3.8 12/05/2023     12/05/2023    CO2 29 12/05/2023    BUN 10 12/05/2023    CREATININE 1.00 12/05/2023    GLUC 91 12/05/2023    GLUF 91 12/05/2023    CALCIUM 9.7 12/05/2023    AST 17 12/05/2023    ALT 11 12/05/2023    ALKPHOS 42 12/05/2023    TP 7.1 12/05/2023    ALB 4.9 12/05/2023    TBILI 0.67 12/05/2023    CHOLESTEROL 139 12/05/2023    HDL 60 12/05/2023    TRIG 46 12/05/2023    LDLCALC 70 12/05/2023    NONHDLC 79 12/05/2023    [id number] 1.492 12/05/2023       Psychiatric Review of Systems:  Behavior over the last 24 hours:  improved.    Sleep: adequate  Appetite: adequate  Medication side effects: denies  ROS: no complaints, denies shortness of breath or chest pain and all other systems are negative for acute changes    Mental Status Evaluation:    Appearance:  casually dressed, adequate grooming, underweight   Behavior: pleasant, cooperative, calm   Speech:  normal rate and volume, fluent, clear   Mood:  euthymic   Affect:  normal range and intensity   Thought Process:  organized, goal directed, linear   Associations: intact associations   Thought Content:  normal   Perceptual Disturbances: none   Risk Potential: Suicidal ideation - None  Homicidal ideation - None  Potential for aggression - No   Sensorium:  oriented to person, place, and situation   Memory:  recent memory intact   Consciousness:  alert and awake   Attention/Concentration: attention span and concentration are age appropriate   Insight:  improving   Judgment: improving   Gait/Station: normal gait/station   Motor Activity: no abnormal movements     Progress Toward Goals:   Chanel Hunter is progressing towards goals of inpatient psychiatric treatment by continued medication compliance and is attending therapeutic modalities on the milieu. However, the patient continues to require inpatient psychiatric hospitalization for continued medication management and titration to optimize symptom reduction, improve sleep hygiene, and demonstrate adequate self-care.     Risk of Harm to Self:   Nursing Suicide Risk Assessment Last 24 hours: C-SSRS Risk (Since Last Contact)  Calculated C-SSRS Risk Score (Since Last Contact): No Risk Indicated  Current Specific Risk Factors include:  none currently  Protective Factors: no current suicidal ideation, ability to communicate with staff on the unit, able to contract for safety on the unit, taking medications as ordered on the unit  Based on today's assessment, Chanel Hunter presents the following risk of harm to self: low    Risk of Harm to Others:  Nursing Homicide Risk Assessment: Violence Risk to Others: Yes- Within the last 6 months  Current Specific Risk Factors include: multiple stressors  Protective Factors: no current homicidal ideation, able to communicate with staff on the unit, compliant with medications on the unit as ordered, compliant with unit milieu  Based on today's assessment, Radha Diaz presents the following risk of harm to others: minimal    The following interventions are recommended: behavioral checks every 7 minutes, continued hospitalization on locked unit      Assessment/Plan   Principal Problem:    Psychosis ([address] [facility])  Active Problems:    Severe heroin use disorder in sustained remission ([address] [facility])    Severe cannabis use disorder ([address] [facility])    Medical clearance for psychiatric admission    Vaping nicotine dependence, tobacco product    Alcohol abuse    Severe protein-calorie malnutrition ([facility])    Vitamin D deficiency        Behavioral Health Medications: all current active meds have been reviewed and continue current psychiatric medications.   Current Facility-Administered Medications   Medication Dose Route Frequency Provider Last Rate    acetaminophen  650 mg Oral Q6H PRN [facility], MD      acetaminophen  650 mg Oral Q4H PRN [facility], MD      acetaminophen  975 mg Oral Q6H PRN [facility], MD      aluminum-magnesium hydroxide-simethicone  30 mL Oral Q4H PRN [facility], MD      haloperidol lactate  2.5 mg Intramuscular Q4H PRN Max 4/day [facility], MD      And    LORazepam  1 mg Intramuscular Q4H PRN Max 4/day [facility], MD      And    benztropine  0.5 mg Intramuscular Q4H PRN Max 4/day [facility], MD      haloperidol lactate  5 mg Intramuscular Q4H PRN Max 4/day [facility], MD      And    LORazepam  2 mg Intramuscular Q4H PRN Max 4/day [facility], MD      And    benztropine  1 mg Intramuscular Q4H PRN Max 4/day [facility], MD      benztropine  1 mg Oral Q4H PRN Max 6/day [facility], MD      hydrOXYzine HCL  50 mg Oral Q6H PRN Max 4/day [facility], MD      Or    diphenhydrAMINE  50 mg Intramuscular Q6H PRN [facility], MD      ergocalciferol  50,000 Units Oral Weekly CINDY Lazcano      glycerin-hypromellose-  1 drop Both Eyes Q3H PRN [facility], MD      hydrOXYzine HCL  100 mg Oral Q6H PRN Max 4/day Bre Puente MD      Or    LORazepam  2 mg Intramuscular Q6H PRN Bre Puente MD      hydrOXYzine HCL  25 mg Oral Q6H PRN Max 4/day Bre Puente MD      melatonin  3 mg Oral HS PRN Bre Puente MD      nicotine  1 patch Transdermal Daily Naveed Stevens MD      nicotine polacrilex  2 mg Oral Q2H PRN Bre Puente MD      polyethylene glycol  17 g Oral Daily PRN Bre Puente MD      propranolol  10 mg Oral Q8H PRN Bre Puente MD      risperiDONE  0.25 mg Oral Q4H PRN Max 6/day Bre Puente MD      risperiDONE  0.5 mg Oral Q4H PRN Max 3/day Bre Puente MD      risperiDONE  1 mg Oral Q4H PRN Max 6/day Bre Puente MD      risperiDONE  1 mg Oral HS Naveed Stevens MD      senna-docusate sodium  1 tablet Oral Daily PRN Bre Puente MD         Risks / Benefits of Treatment:  Risks, benefits, and possible side effects of medications explained to patient and patient verbalizes understanding and agreement for treatment. Counseling / Coordination of Care:  Patient's progress reviewed with nursing staff. Medications, treatment progress and treatment plan reviewed with patient. Supportive counseling provided to the patient. Total floor/unit time spent today 25 minutes. Greater than 50% of total time was spent with the patient and / or family counseling and / or coordination of care. A description of the counseling / coordination of care: medication education, treatment plan, supportive therapy.

## 2023-12-13 PROCEDURE — 99232 SBSQ HOSP IP/OBS MODERATE 35: CPT | Performed by: STUDENT IN AN ORGANIZED HEALTH CARE EDUCATION/TRAINING PROGRAM

## 2023-12-13 RX ADMIN — NICOTINE 1 PATCH: 21 PATCH, EXTENDED RELEASE TRANSDERMAL at 08:18

## 2023-12-13 RX ADMIN — RISPERIDONE 1 MG: 1 TABLET ORAL at 21:00

## 2023-12-13 RX ADMIN — NICOTINE POLACRILEX 2 MG: 2 GUM, CHEWING BUCCAL at 20:57

## 2023-12-13 RX ADMIN — NICOTINE POLACRILEX 2 MG: 2 GUM, CHEWING BUCCAL at 08:49

## 2023-12-13 NOTE — PLAN OF CARE
Problem: Ineffective Coping  Goal: Participates in unit activities  Description: Interventions:  - Provide therapeutic environment   - Provide required programming   - Redirect inappropriate behaviors   Outcome: Progressing     Problem: Anxiety  Goal: Anxiety is at manageable level  Description: Interventions:  - Assess and monitor patient's anxiety level. - Monitor for signs and symptoms (heart palpitations, chest pain, shortness of breath, headaches, nausea, feeling jumpy, restlessness, irritable, apprehensive). - Collaborate with interdisciplinary team and initiate plan and interventions as ordered.   - Fabius patient to unit/surroundings  - Explain treatment plan  - Encourage participation in care  - Encourage verbalization of concerns/fears  - Identify coping mechanisms  - Assist in developing anxiety-reducing skills  - Administer/offer alternative therapies  - Limit or eliminate stimulants  Outcome: Progressing     Problem: Risk for Violence/Aggression Toward Others  Goal: Refrain from harming others  Outcome: Progressing  Goal: Control angry outbursts  Description: Interventions:  - Monitor patient closely, per order  - Ensure early verbal de-escalation  - Monitor prn medication needs  - Set reasonable/therapeutic limits, outline behavioral expectations, and consequences   - Provide a non-threatening milieu, utilizing the least restrictive interventions   Outcome: Progressing     Problem: Alteration in Orientation  Goal: Express concerns related to confused thinking related to:  Description: Interventions:  - Encourage patient to express feelings, fears, frustrations, hopes  - Assign consistent caregivers   - Fabius/re-orient patient as needed  - Allow comfort items, as appropriate  - Provide visual cues, signs, etc.   Outcome: Progressing  Goal: Allow medical examinations, as recommended  Description: Interventions:  - Provide physical/neurological exams and/or referrals, per provider   Outcome: Progressing  Goal: Cooperate with recommended testing/procedures  Description: Interventions:  - Determine need for ancillary testing  - Observe for mental status changes  - Implement falls/precaution protocol   Outcome: Progressing     Problem: Nutrition/Hydration-ADULT  Goal: Nutrient/Hydration intake appropriate for improving, restoring or maintaining nutritional needs  Description: Monitor and assess patient's nutrition/hydration status for malnutrition. Collaborate with interdisciplinary team and initiate plan and interventions as ordered. Monitor patient's weight and dietary intake as ordered or per policy. Utilize nutrition screening tool and intervene as necessary. Determine patient's food preferences and provide high-protein, high-caloric foods as appropriate.      INTERVENTIONS:  - Monitor oral intake, urinary output, labs, and treatment plans  - Assess nutrition and hydration status and recommend course of action  - Evaluate amount of meals eaten  - Assist patient with eating if necessary   - Allow adequate time for meals  - Recommend/ encourage appropriate diets, oral nutritional supplements, and vitamin/mineral supplements  - Order, calculate, and assess calorie counts as needed  - Recommend, monitor, and adjust tube feedings and TPN/PPN based on assessed needs  - Assess need for intravenous fluids  - Provide specific nutrition/hydration education as appropriate  - Include patient/family/caregiver in decisions related to nutrition  Outcome: Progressing

## 2023-12-13 NOTE — NURSING NOTE
Pt visible, calm, quiet throughout evening. Pt appears constricted in affect, guarded but pleasant and cooperative. Pt denies emotional needs, requested and given PRN melatonin 3mg for insomnia @ 21:37; effective pt appears asleep.

## 2023-12-13 NOTE — NURSING NOTE
Pt is visible on the unit and social with select peers. Pt is calm and cooperative upon approach. Pt is medication and meal compliant. Pt denies SI, HI, AH, and VH. Denies any needs at this time.

## 2023-12-13 NOTE — PROGRESS NOTES
12/13/23 0850   Team Meeting   Meeting Type Daily Rounds   Team Members Present   Team Members Present Physician;Nurse;   Physician Team Member Иван [facility] Team Member Neetu   Patient/Family Present   Patient Present No   Patient's Family Present No     Pt med/meal compliant. Visible on the unit. Discharge pending for tomorrow.

## 2023-12-13 NOTE — PROGRESS NOTES
Progress Note - [address] 25 y.o. male MRN: [id number]  Unit/Bed#: [facility] 347-01 Encounter: [id number]    Subjective:   Per nursing report, patient has been doing well. He has been calm and appropriate. He remains medication compliant. He has been without acute behavioral issues. Patient is doing well today. He reports that his mood is good. He denies any problems tolerating risperidone. He participates in groups and is social with others on the unit. He denies any problems with depressed mood. He denies any SI, HI, or AVH. He is organized in thoughts and without any overt delusions. He demonstrates no signs or symptoms of juliana. Behavior over the last 24 hours:  unchanged  Sleep: normal  Appetite: normal  Medication side effects: No  ROS: no complaints and all other systems are negative    Mental Status Evaluation:  Appearance:  dressed appropriately and adequately groomed   Behavior:  calm, pleasant, and cooperative   Speech:  normal rate and volume   Mood:  euthymic   Affect:  normal range and intensity, appropriate   Thought Process:  linear and goal directed   Associations: intact associations   Thought Content:  no overt delusions   Perceptual Disturbances: denies auditory or visual hallucinations when asked, does not appear responding to internal stimuli   Risk Potential: Suicidal ideation - None  Homicidal ideation - None  Potential for aggression - Not at present   Sensorium:  oriented to person, place, and time/date   Memory:  recent and remote memory grossly intact   Consciousness:  alert and awake   Attention/Concentration: attention span and concentration are age appropriate   Insight:  limited   Judgment: limited   Gait/Station: normal gait/station, normal balance   Motor Activity: no abnormal movements     Medications: all current active meds have been reviewed.   Current Facility-Administered Medications   Medication Dose Route Frequency Provider Last Rate    acetaminophen  650 mg Oral Q6H PRN Gurjit Cueto MD      acetaminophen  650 mg Oral Q4H PRN Gurjit Cueto MD      acetaminophen  975 mg Oral Q6H PRN Gurjit Cueto MD      aluminum-magnesium hydroxide-simethicone  30 mL Oral Q4H PRN Gurjit Cueto MD      haloperidol lactate  2.5 mg Intramuscular Q4H PRN Max 4/day Gurjit Cueto MD      And    LORazepam  1 mg Intramuscular Q4H PRN Max 4/day Gurjit Cueto MD      And    benztropine  0.5 mg Intramuscular Q4H PRN Max 4/day Gurjit Cueto MD      haloperidol lactate  5 mg Intramuscular Q4H PRN Max 4/day Gurjit Cueto MD      And    LORazepam  2 mg Intramuscular Q4H PRN Max 4/day Gurjit Cueto MD      And    benztropine  1 mg Intramuscular Q4H PRN Max 4/day Gurjit Cueto MD      benztropine  1 mg Oral Q4H PRN Max 6/day Gurjit Cueto MD      hydrOXYzine HCL  50 mg Oral Q6H PRN Max 4/day Gurjit Cueto MD      Or    diphenhydrAMINE  50 mg Intramuscular Q6H PRN Gurjit Cueto MD      ergocalciferol  50,000 Units Oral Weekly CINDY Lazcano      glycerin-hypromellose-  1 drop Both Eyes Q3H PRN Gurjit Cueto MD      hydrOXYzine HCL  100 mg Oral Q6H PRN Max 4/day Gurjit Cueto MD      Or    LORazepam  2 mg Intramuscular Q6H PRN Gurjit Cueto MD      hydrOXYzine HCL  25 mg Oral Q6H PRN Max 4/day Gurjit Cueto MD      melatonin  3 mg Oral HS PRN uGrjit Cueto MD      nicotine  1 patch Transdermal Daily Chito Rivero MD      nicotine polacrilex  2 mg Oral Q2H PRN Gurjit Cueto MD      polyethylene glycol  17 g Oral Daily PRN Gurjit Cueto MD      propranolol  10 mg Oral Q8H PRN Gurjit Cueto MD      risperiDONE  0.25 mg Oral Q4H PRN Max 6/day Gurjit Cueto MD      risperiDONE  0.5 mg Oral Q4H PRN Max 3/day Gurjit Cueto MD      risperiDONE  1 mg Oral Q4H PRN Max 6/day Gurjit Cueto MD      risperiDONE  1 mg Oral HS Larwence Beecham, MD      senna-docusate sodium  1 tablet Oral Daily PRN Gurjit Cueto MD         Labs:  I have personally reviewed all pertinent laboratory/tests results  Most Recent Labs:   Lab Results   Component Value Date    WBC 6.71 12/05/2023    RBC 4.73 12/05/2023    HGB 14.0 12/05/2023    HCT 43.5 12/05/2023     12/05/2023    RDW 12.1 12/05/2023    NEUTROABS 2.80 12/05/2023    SODIUM 141 12/05/2023    K 3.8 12/05/2023     12/05/2023    CO2 29 12/05/2023    BUN 10 12/05/2023    CREATININE 1.00 12/05/2023    GLUC 91 12/05/2023    CALCIUM 9.7 12/05/2023    AST 17 12/05/2023    ALT 11 12/05/2023    ALKPHOS 42 12/05/2023    TP 7.1 12/05/2023    ALB 4.9 12/05/2023    TBILI 0.67 12/05/2023    CHOLESTEROL 139 12/05/2023    HDL 60 12/05/2023    TRIG 46 12/05/2023    LDLCALC 70 12/05/2023    NONHDLC 79 12/05/2023    [id number] 1.492 12/05/2023    SYPHILISAB Non-reactive 12/05/2023         Assessment/Plan   Principal Problem:    Psychosis ([address] [facility])  Active Problems:    Severe heroin use disorder in sustained remission (HCC)    Severe cannabis use disorder ([address] [facility])    Medical clearance for psychiatric admission    Vaping nicotine dependence, tobacco product    Alcohol abuse    Severe protein-calorie malnutrition (HCC)    Vitamin D deficiency    Recommended Treatment:   Continue risperidone 1 mg QHS. Continue all other medications at current doses as above. Encourage group therapy, milieu therapy and occupational therapy  All current active medications have been reviewed  Encourage group therapy, milieu therapy and occupational therapy  [facility] checks every 7 minutes  On 303 commitment  Discharge tomorrow    ----------------------------------------    Progress Toward Goals: progressing    Risks / Benefits of Treatment:    Risks, benefits, and possible side effects of medications explained to patient and patient verbalizes understanding and agreement for treatment. Counseling / Coordination of Care:    Patient's progress discussed with staff in treatment team meeting. Medications, treatment progress and treatment plan reviewed with patient.     Ashlee Brandt Shanita Lyons MD 12/13/23

## 2023-12-14 VITALS
HEIGHT: 70 IN | TEMPERATURE: 98 F | DIASTOLIC BLOOD PRESSURE: 59 MMHG | HEART RATE: 76 BPM | RESPIRATION RATE: 16 BRPM | OXYGEN SATURATION: 98 % | SYSTOLIC BLOOD PRESSURE: 105 MMHG | WEIGHT: 120.2 LBS | BODY MASS INDEX: 17.21 KG/M2

## 2023-12-14 PROBLEM — Z00.8 MEDICAL CLEARANCE FOR PSYCHIATRIC ADMISSION: Status: RESOLVED | Noted: 2023-12-05 | Resolved: 2023-12-14

## 2023-12-14 PROBLEM — F11.21: Status: RESOLVED | Noted: 2023-12-04 | Resolved: 2023-12-14

## 2023-12-14 PROBLEM — E43 SEVERE PROTEIN-CALORIE MALNUTRITION (HCC): Status: RESOLVED | Noted: 2023-12-05 | Resolved: 2023-12-14

## 2023-12-14 PROCEDURE — 99239 HOSP IP/OBS DSCHRG MGMT >30: CPT | Performed by: STUDENT IN AN ORGANIZED HEALTH CARE EDUCATION/TRAINING PROGRAM

## 2023-12-14 RX ORDER — RISPERIDONE 1 MG/1
1 TABLET ORAL
Qty: 30 TABLET | Refills: 1 | Status: SHIPPED | OUTPATIENT
Start: 2023-12-14

## 2023-12-14 RX ORDER — ERGOCALCIFEROL 1.25 MG/1
50000 CAPSULE ORAL WEEKLY
Qty: 4 CAPSULE | Refills: 0 | Status: SHIPPED | OUTPATIENT
Start: 2023-12-19 | End: 2024-01-24

## 2023-12-14 RX ADMIN — NICOTINE POLACRILEX 2 MG: 2 GUM, CHEWING BUCCAL at 06:12

## 2023-12-14 RX ADMIN — NICOTINE 1 PATCH: 21 PATCH, EXTENDED RELEASE TRANSDERMAL at 08:13

## 2023-12-14 RX ADMIN — NICOTINE POLACRILEX 2 MG: 2 GUM, CHEWING BUCCAL at 11:55

## 2023-12-14 NOTE — SOCIAL WORK
Pt to D/C today. Pt denies SI/HI/AVH. Pt oriented x3. Pt to d/c to [facility] and his friend will  upon discharge. Pt to follow up with MATILDA on 12/15. Pt discharged with medication in hand.     Discharge Address: [address] [place] [address]   Phone: [phone]

## 2023-12-14 NOTE — NURSING NOTE
Pt visible,appearing to be more social and engaging  with peers and staff this evening, seen playing chess with others. Pt appears slightly brighter than last two evenings in this writer's experience with the pt. Pt denies SI/HI/AH/VH and is medication adherent. Initially, pt requested PRN melatonin "for later," but pt ended up not needing PRN for sleep thus far.

## 2023-12-14 NOTE — BH TRANSITION RECORD
Contact Information: If you have any questions, concerns, pended studies, tests and/or procedures, or emergencies regarding your inpatient behavioral health visit. Please contact [address] behavioral health unit [facility] [phone]  and ask to speak to a , nurse or physician. A contact is available 24 hours/ 7 days a week at this number. Summary of Procedures Performed During your Stay:  Below is a list of major procedures performed during your hospital stay and a summary of results:  - Cardiac Procedures/Studies: EKG 12/5/23, Normal sinus rhythm, rightward axis, pulmonary disease pattern, QTc 420. Pending Studies (From admission, onward)      None          Please follow up on the above pending studies with your PCP and/or referring provider.     Kianna Santos MD 12/14/23

## 2023-12-14 NOTE — PLAN OF CARE
Problem: Ineffective Coping  Goal: Participates in unit activities  Description: Interventions:  - Provide therapeutic environment   - Provide required programming   - Redirect inappropriate behaviors   Outcome: Completed     Problem: Anxiety  Goal: Anxiety is at manageable level  Description: Interventions:  - Assess and monitor patient's anxiety level. - Monitor for signs and symptoms (heart palpitations, chest pain, shortness of breath, headaches, nausea, feeling jumpy, restlessness, irritable, apprehensive). - Collaborate with interdisciplinary team and initiate plan and interventions as ordered.   - [place] patient to unit/surroundings  - Explain treatment plan  - Encourage participation in care  - Encourage verbalization of concerns/fears  - Identify coping mechanisms  - Assist in developing anxiety-reducing skills  - Administer/offer alternative therapies  - Limit or eliminate stimulants  Outcome: Completed     Problem: Risk for Violence/Aggression Toward Others  Goal: Refrain from harming others  Outcome: Completed  Goal: Control angry outbursts  Description: Interventions:  - Monitor patient closely, per order  - Ensure early verbal de-escalation  - Monitor prn medication needs  - Set reasonable/therapeutic limits, outline behavioral expectations, and consequences   - Provide a non-threatening milieu, utilizing the least restrictive interventions   Outcome: Completed     Problem: Alteration in Orientation  Goal: Express concerns related to confused thinking related to:  Description: Interventions:  - Encourage patient to express feelings, fears, frustrations, hopes  - Assign consistent caregivers   - [place]/re-orient patient as needed  - Allow comfort items, as appropriate  - Provide visual cues, signs, etc.   Outcome: Completed  Goal: Allow medical examinations, as recommended  Description: Interventions:  - Provide physical/neurological exams and/or referrals, per provider   Outcome: Completed  Goal: Cooperate with recommended testing/procedures  Description: Interventions:  - Determine need for ancillary testing  - Observe for mental status changes  - Implement falls/precaution protocol   Outcome: Completed     Problem: Nutrition/Hydration-ADULT  Goal: Nutrient/Hydration intake appropriate for improving, restoring or maintaining nutritional needs  Description: Monitor and assess patient's nutrition/hydration status for malnutrition. Collaborate with interdisciplinary team and initiate plan and interventions as ordered. Monitor patient's weight and dietary intake as ordered or per policy. Utilize nutrition screening tool and intervene as necessary. Determine patient's food preferences and provide high-protein, high-caloric foods as appropriate.      INTERVENTIONS:  - Monitor oral intake, urinary output, labs, and treatment plans  - Assess nutrition and hydration status and recommend course of action  - Evaluate amount of meals eaten  - Assist patient with eating if necessary   - Allow adequate time for meals  - Recommend/ encourage appropriate diets, oral nutritional supplements, and vitamin/mineral supplements  - Order, calculate, and assess calorie counts as needed  - Recommend, monitor, and adjust tube feedings and TPN/PPN based on assessed needs  - Assess need for intravenous fluids  - Provide specific nutrition/hydration education as appropriate  - Include patient/family/caregiver in decisions related to nutrition  Outcome: Completed     Problem: DISCHARGE PLANNING  Goal: Discharge to home or other facility with appropriate resources  Description: INTERVENTIONS:  - Identify barriers to discharge w/patient and caregiver  - Arrange for needed discharge resources and transportation as appropriate  - Identify discharge learning needs (meds, wound care, etc.)  - Arrange for interpretive services to assist at discharge as needed  - Refer to Case Management Department for coordinating discharge planning if the patient needs post-hospital services based on physician/advanced practitioner order or complex needs related to functional status, cognitive ability, or social support system  Outcome: Completed

## 2023-12-14 NOTE — SOCIAL WORK
DARIEL called [facility] to complete Liability assessment. FERDINAND requested SW send copy of Pt's photo ID. DARIEL sent photo ID and was advised county will reach out to pt on the unit to complete assessment. DARIEL requested return call to confirm where pt will be following up.

## 2023-12-14 NOTE — DISCHARGE INSTR - OTHER ORDERS
CRISIS INFORMATION  If you are experiencing a mental health emergency, you may call the [facility] 24 hours a day, 7 days per week at [phone]. In [place], call [phone]. Ray Dillard is a confidential 24/7 telephone support service manned by trained mental health consumers. [place] provides support, a listening ear and can provide information about available services. [facility] specializes in the concerns of mental health consumers, their families and friends. However, we are also here for anyone who has a mental health concern, is confused about or just doesn't know anything about mental health or where to get information. To reach Ray Dillard, call [phone]. HOW TO GET SUBSTANCE ABUSE HELP:  If you or someone you know has a drug or alcohol problem, there is help:  [address]: [address]: [phone]  An assessment is the first step. In addition to those listed there are other programs available in the area but assessment is best to determine an appropriate level of care. If you DO NOT have Medical Assistance ([place]) or [employer], an assessment can be scheduled at one of these providers:  [facility]  [address] [facility], [address] [place] [address]  [phone]   [facility]  [address] [facility],2 And 3 S Floors., [address]  [address] [facility].  JOYCE, [address]  [address]  [address] [facility]   Step by [address]  [address] [place]., MICHELLE[address]  [address] [facility]., [facility], [address] [place] [address]  [phone]     If you [address], an assessment can be scheduled at one of these providers:  [place] on Alcohol & Drug Abuse  [facility]., [facility], [address] [facility]  [address] [facility] [address], [facility]  150 [facility] D&A Intake Unit  [address] 1113 [facility] 1st Floor, [place]  [phone]  1 [facility], Chaparro Morales Asa, [address] [facility]  17[address] [facility],2 And 3 S Floors., [facility], [address] [facility]  [address] [facility]. JOYCE, [address] [address] [facility] [address]  [phone]   NET ([address])  90 [facility] 1801 [facility], [place]  [address]  [address]   Step by [address] [facility], [address] [facility]  [address] [facility]., [facility], [address] [facility]  [address] [facility] [address] [facility], 350 [facility]  [phone]     If you [address] [facility], an assessment can be scheduled at one of these providers. Please contact these Providers to determine if they are in your network plan:  [facility] D&A Intake Unit  [address] 1113 [facility], 1st Floor, [place]  [facility]  17[address] [facility],2 And 3 S Floors., [facility], 350 [facility]  [phone]   [address] [facility]. JOYCE, [address]  [phone]   NET ([address])  90 [facility] 1801 [facility], [place]  [address]  [address] [address] [facility], 1515 [facility]., [facility], [address]     3225 [facility] ([facility]) operates an emergency shelter which is open every night, November 15th to April 30th    Guest Requirements  [facility] accepts adults, 18 years and older, who identify themselves as homeless.  Adults must be able to navigate multiple flights of stairs and independently care for themselves due to the construction of the facility in the [address] [place] [address]. [facility] is not able to provide shelter to minors under 25years of age. Individuals presenting at prison who are unable to meet the above criteria will be assisted by [facility] through contacting [address] [place] [address] authorities for assistance. Shelter Guests are required to obtain a voucher from the [employer] located at [employer]. [address]. Operations  Shelter doors open at 5 PM and will close after 8 PM. Azucena Rod will not be allowed entrance AFTER 8 PM unless they have made prior arrangements with the [place] or have police escort. Guests are asked to not congregate outside the shelter before 5 PM. Guest admittance to the shelter is on the [facility] side of the building. [facility] bed capacity is 70 beds per night. Beds may not be held or reserved in advance of a Guest’s stay at shelter. Beds are assigned on a first arrived, first assigned basis. Beds will be held for Guests who stay at the shelter the following night until 8 PM, at that time the bed may be assigned to other individuals seeking shelter. Meera Kipper is provided every night from 5:30 PM to 7:30 PM.  Showers are available if volunteers are present. Partner service providers are accessible at the shelter during the weeknights. List of visiting partner service providers will be posted on the Service Provider Calendar in the shelter. Guests are woken at 6 AM every day and are required to leave the premises by [address]   [address] [address]  Phone : [phone]    The [employer] provides safe housing, nutritionally balanced meals, clothing, access to medical care, and a wide variety of local social service and support organizations for men 25years old and older.  How to access our Emergency Shelter:  Come to the Emergency Shelter at [address] [place] [address], during regular business hours (8:30 am - 5:00 pm) with a valid ID. Call us at [phone] ext [phone] at any time and we will give you instructions on how to access our facility and to learn more about the services we provide. We offer our services to all men over 18 regardless of race, color, creed, political affiliation, Episcopal affiliation, or sexual orientation. If you’re under the age of 25, a woman, or have dependent children, we recommend you call 80 from any phone to find shelter and other services. 211 is the [facility] direct phone line for "coordinated care" in the [facility]. They have expertise and access to a wide range of housing options. If you are a man over the age of 25, without dependent children living with you, they will very likely direct you back to us. But if you are in any other demographic or special needs group, they have numerous options available for you. Call [address].  [facility] [address]. Markel Chang, [address] [place] [address] operation November 15th - April 15th for the 2902-7404 Season. The [address] [place] [address] St provides shelter, food, and a safe place for those experiencing homelessness in our community. The [facility] is open to welcome guests from November 15th through April 15th. Our doors are open each night from 7:00 pm - 7:00 am (Intake hours: 7:00 pm - 9:00 pm) to provide this critical service for our neighbors. [address] guests will always find themselves greeted with kindness and a safe place to sleep. The [facility] provides a safe place to sleep and a dignified sense of community for neighbors in need. The Y also serves healthy, prepared suppers seven days a week to our [employer].

## 2023-12-14 NOTE — NURSING NOTE
Pt is calm and cooperative upon approach, reviewed AVS with pt, pt verbalized understanding. Pt denies SI, HI, AH,and VH. Pt confirmed their belongings and was walked off the unit with this RN @13:15.

## 2023-12-14 NOTE — PROGRESS NOTES
12/14/23 0844   Team Meeting   Meeting Type Daily Rounds   Team Members Present   Team Members Present Physician;Nurse;   Physician Team Member Иван [facility] Team [facility] Team Member Neetu   Patient/Family Present   Patient Present No   Patient's Family Present No     Discharge today.

## 2023-12-14 NOTE — DISCHARGE INSTR - APPOINTMENTS
Kayleen Ortiz or Viola, our Loraine and Jocelin, will be calling you after your discharge, on the phone number that you provided. They will be available as an additional support, if needed. If you wish to speak with one of them, you may contact Kayleen Ortiz at [phone] or Michael Walker at [phone].
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
## 2023-12-14 NOTE — PROGRESS NOTES
Met with Peggy Dillon completed his relapse prevention. He was able to identify his symptoms, warning signs, coping skills and support people. We reviewed the community supports.  He is looking forward to discharge today,

## 2023-12-14 NOTE — PLAN OF CARE
Problem: Ineffective Coping  Goal: Participates in unit activities  Description: Interventions:  - Provide therapeutic environment   - Provide required programming   - Redirect inappropriate behaviors   Outcome: Progressing     Problem: Anxiety  Goal: Anxiety is at manageable level  Description: Interventions:  - Assess and monitor patient's anxiety level. - Monitor for signs and symptoms (heart palpitations, chest pain, shortness of breath, headaches, nausea, feeling jumpy, restlessness, irritable, apprehensive). - Collaborate with interdisciplinary team and initiate plan and interventions as ordered.   - [place] patient to unit/surroundings  - Explain treatment plan  - Encourage participation in care  - Encourage verbalization of concerns/fears  - Identify coping mechanisms  - Assist in developing anxiety-reducing skills  - Administer/offer alternative therapies  - Limit or eliminate stimulants  Outcome: Progressing     Problem: Risk for Violence/Aggression Toward Others  Goal: Refrain from harming others  Outcome: Progressing  Goal: Control angry outbursts  Description: Interventions:  - Monitor patient closely, per order  - Ensure early verbal de-escalation  - Monitor prn medication needs  - Set reasonable/therapeutic limits, outline behavioral expectations, and consequences   - Provide a non-threatening milieu, utilizing the least restrictive interventions   Outcome: Progressing     Problem: Alteration in Orientation  Goal: Express concerns related to confused thinking related to:  Description: Interventions:  - Encourage patient to express feelings, fears, frustrations, hopes  - Assign consistent caregivers   - [place]/re-orient patient as needed  - Allow comfort items, as appropriate  - Provide visual cues, signs, etc.   Outcome: Progressing  Goal: Allow medical examinations, as recommended  Description: Interventions:  - Provide physical/neurological exams and/or referrals, per provider   Outcome: Progressing  Goal: Cooperate with recommended testing/procedures  Description: Interventions:  - Determine need for ancillary testing  - Observe for mental status changes  - Implement falls/precaution protocol   Outcome: Progressing     Problem: Nutrition/Hydration-ADULT  Goal: Nutrient/Hydration intake appropriate for improving, restoring or maintaining nutritional needs  Description: Monitor and assess patient's nutrition/hydration status for malnutrition. Collaborate with interdisciplinary team and initiate plan and interventions as ordered. Monitor patient's weight and dietary intake as ordered or per policy. Utilize nutrition screening tool and intervene as necessary. Determine patient's food preferences and provide high-protein, high-caloric foods as appropriate.      INTERVENTIONS:  - Monitor oral intake, urinary output, labs, and treatment plans  - Assess nutrition and hydration status and recommend course of action  - Evaluate amount of meals eaten  - Assist patient with eating if necessary   - Allow adequate time for meals  - Recommend/ encourage appropriate diets, oral nutritional supplements, and vitamin/mineral supplements  - Order, calculate, and assess calorie counts as needed  - Recommend, monitor, and adjust tube feedings and TPN/PPN based on assessed needs  - Assess need for intravenous fluids  - Provide specific nutrition/hydration education as appropriate  - Include patient/family/caregiver in decisions related to nutrition  Outcome: Progressing

## 2024-06-25 ENCOUNTER — OCCMED (OUTPATIENT)
Dept: URGENT CARE | Facility: CLINIC | Age: 29
End: 2024-06-25

## 2024-06-25 DIAGNOSIS — Z02.83 ENCOUNTER FOR DRUG SCREENING: Primary | ICD-10-CM

## 2024-06-25 DIAGNOSIS — Z02.1 ENCOUNTER FOR PRE-EMPLOYMENT HEALTH SCREENING EXAMINATION: Primary | ICD-10-CM
